# Patient Record
Sex: FEMALE | Race: WHITE | NOT HISPANIC OR LATINO | Employment: OTHER | URBAN - METROPOLITAN AREA
[De-identification: names, ages, dates, MRNs, and addresses within clinical notes are randomized per-mention and may not be internally consistent; named-entity substitution may affect disease eponyms.]

---

## 2017-03-09 ENCOUNTER — OFFICE VISIT - RIVER FALLS (OUTPATIENT)
Dept: FAMILY MEDICINE | Facility: CLINIC | Age: 72
End: 2017-03-09

## 2017-03-09 ASSESSMENT — MIFFLIN-ST. JEOR: SCORE: 1314.6

## 2017-05-08 ENCOUNTER — COMMUNICATION - RIVER FALLS (OUTPATIENT)
Dept: FAMILY MEDICINE | Facility: CLINIC | Age: 72
End: 2017-05-08

## 2017-05-08 ENCOUNTER — OFFICE VISIT - RIVER FALLS (OUTPATIENT)
Dept: FAMILY MEDICINE | Facility: CLINIC | Age: 72
End: 2017-05-08

## 2017-05-08 ASSESSMENT — MIFFLIN-ST. JEOR: SCORE: 1315.96

## 2017-05-09 LAB
CREAT SERPL-MCNC: 0.83 MG/DL (ref 0.6–0.93)
GLUCOSE BLD-MCNC: 124 MG/DL (ref 65–99)

## 2017-06-08 ENCOUNTER — OFFICE VISIT - RIVER FALLS (OUTPATIENT)
Dept: FAMILY MEDICINE | Facility: CLINIC | Age: 72
End: 2017-06-08

## 2017-11-06 ENCOUNTER — OFFICE VISIT - RIVER FALLS (OUTPATIENT)
Dept: FAMILY MEDICINE | Facility: CLINIC | Age: 72
End: 2017-11-06

## 2017-11-06 ASSESSMENT — MIFFLIN-ST. JEOR: SCORE: 1298.27

## 2017-11-07 LAB
CHOLEST SERPL-MCNC: 186 MG/DL
CHOLEST/HDLC SERPL: 4.1 {RATIO}
CREAT SERPL-MCNC: 0.88 MG/DL (ref 0.6–0.93)
GLUCOSE BLD-MCNC: 97 MG/DL (ref 65–99)
HDLC SERPL-MCNC: 45 MG/DL
LDLC SERPL CALC-MCNC: 108 MG/DL
NONHDLC SERPL-MCNC: 141 MG/DL
TRIGL SERPL-MCNC: 212 MG/DL

## 2018-01-04 ENCOUNTER — OFFICE VISIT - RIVER FALLS (OUTPATIENT)
Dept: FAMILY MEDICINE | Facility: CLINIC | Age: 73
End: 2018-01-04

## 2018-07-24 ENCOUNTER — AMBULATORY - RIVER FALLS (OUTPATIENT)
Dept: FAMILY MEDICINE | Facility: CLINIC | Age: 73
End: 2018-07-24

## 2018-07-25 LAB
CHOLEST SERPL-MCNC: 178 MG/DL
CHOLEST/HDLC SERPL: 4 {RATIO}
CREAT SERPL-MCNC: 0.82 MG/DL (ref 0.6–0.93)
GLUCOSE BLD-MCNC: 90 MG/DL (ref 65–99)
HDLC SERPL-MCNC: 45 MG/DL
LDLC SERPL CALC-MCNC: 99 MG/DL
NONHDLC SERPL-MCNC: 133 MG/DL
TRIGL SERPL-MCNC: 225 MG/DL

## 2018-08-20 ENCOUNTER — OFFICE VISIT - RIVER FALLS (OUTPATIENT)
Dept: FAMILY MEDICINE | Facility: CLINIC | Age: 73
End: 2018-08-20

## 2018-08-20 ASSESSMENT — MIFFLIN-ST. JEOR: SCORE: 1311.88

## 2018-11-12 ENCOUNTER — OFFICE VISIT - RIVER FALLS (OUTPATIENT)
Dept: FAMILY MEDICINE | Facility: CLINIC | Age: 73
End: 2018-11-12

## 2018-11-12 ASSESSMENT — MIFFLIN-ST. JEOR: SCORE: 1277.41

## 2018-11-13 LAB
CHOLEST SERPL-MCNC: 170 MG/DL
CHOLEST/HDLC SERPL: 3.3 {RATIO}
HDLC SERPL-MCNC: 51 MG/DL
LDLC SERPL CALC-MCNC: 91 MG/DL
NONHDLC SERPL-MCNC: 119 MG/DL
TRIGL SERPL-MCNC: 184 MG/DL

## 2019-07-09 ENCOUNTER — OFFICE VISIT - RIVER FALLS (OUTPATIENT)
Dept: FAMILY MEDICINE | Facility: CLINIC | Age: 74
End: 2019-07-09

## 2019-07-19 ENCOUNTER — OFFICE VISIT - RIVER FALLS (OUTPATIENT)
Dept: FAMILY MEDICINE | Facility: CLINIC | Age: 74
End: 2019-07-19

## 2019-11-19 ENCOUNTER — OFFICE VISIT - RIVER FALLS (OUTPATIENT)
Dept: FAMILY MEDICINE | Facility: CLINIC | Age: 74
End: 2019-11-19

## 2019-11-19 ENCOUNTER — TRANSFERRED RECORDS (OUTPATIENT)
Dept: MULTI SPECIALTY CLINIC | Facility: CLINIC | Age: 74
End: 2019-11-19

## 2019-11-19 ASSESSMENT — MIFFLIN-ST. JEOR: SCORE: 1268.33

## 2019-11-20 ENCOUNTER — COMMUNICATION - RIVER FALLS (OUTPATIENT)
Dept: FAMILY MEDICINE | Facility: CLINIC | Age: 74
End: 2019-11-20

## 2019-11-20 LAB
BUN SERPL-MCNC: 15 MG/DL (ref 7–25)
BUN/CREAT RATIO - HISTORICAL: ABNORMAL (ref 6–22)
CALCIUM SERPL-MCNC: 10.9 MG/DL (ref 8.6–10.4)
CHLORIDE BLD-SCNC: 103 MMOL/L (ref 98–110)
CHOLEST SERPL-MCNC: 184 MG/DL
CHOLEST/HDLC SERPL: 3.5 {RATIO}
CO2 SERPL-SCNC: 26 MMOL/L (ref 20–32)
CREAT SERPL-MCNC: 0.78 MG/DL (ref 0.6–0.93)
EGFRCR SERPLBLD CKD-EPI 2021: 74 ML/MIN/1.73M2
ERYTHROCYTE [DISTWIDTH] IN BLOOD BY AUTOMATED COUNT: 11.7 % (ref 11–15)
GLUCOSE BLD-MCNC: 101 MG/DL (ref 65–99)
HCT VFR BLD AUTO: 37.5 % (ref 35–45)
HDLC SERPL-MCNC: 52 MG/DL
HGB BLD-MCNC: 13.4 GM/DL (ref 11.7–15.5)
LDLC SERPL CALC-MCNC: 104 MG/DL
MCH RBC QN AUTO: 33.6 PG (ref 27–33)
MCHC RBC AUTO-ENTMCNC: 35.7 GM/DL (ref 32–36)
MCV RBC AUTO: 94 FL (ref 80–100)
NONHDLC SERPL-MCNC: 132 MG/DL
PLATELET # BLD AUTO: 315 10*3/UL (ref 140–400)
PMV BLD: 10.6 FL (ref 7.5–12.5)
POTASSIUM BLD-SCNC: 4.5 MMOL/L (ref 3.5–5.3)
RBC # BLD AUTO: 3.99 10*6/UL (ref 3.8–5.1)
SODIUM SERPL-SCNC: 139 MMOL/L (ref 135–146)
TRIGL SERPL-MCNC: 161 MG/DL
WBC # BLD AUTO: 6.6 10*3/UL (ref 3.8–10.8)

## 2020-12-10 ENCOUNTER — OFFICE VISIT - RIVER FALLS (OUTPATIENT)
Dept: FAMILY MEDICINE | Facility: CLINIC | Age: 75
End: 2020-12-10

## 2021-02-18 ENCOUNTER — OFFICE VISIT - RIVER FALLS (OUTPATIENT)
Dept: FAMILY MEDICINE | Facility: CLINIC | Age: 76
End: 2021-02-18

## 2021-02-22 ENCOUNTER — OFFICE VISIT - RIVER FALLS (OUTPATIENT)
Dept: FAMILY MEDICINE | Facility: CLINIC | Age: 76
End: 2021-02-22

## 2021-02-22 LAB
B-TYPE NATRIURETIC PEPTIDE: 24 PG/ML (ref 0–100)
BASOPHILS # BLD MANUAL: 0 CELLS/UL
BASOPHILS NFR BLD AUTO: 0.1 %
BUN SERPL-MCNC: 15 MG/DL
BUN/CREAT RATIO - HISTORICAL: 19
CALCIUM SERPL-MCNC: 9.8 MEQ/DL
CHLORIDE BLD-SCNC: 102 MEQ/L
CO2 SERPL-SCNC: 22 MEQ/L
CREAT SERPL-MCNC: 0.79 MG/DL
EOSINOPHIL # BLD MANUAL: 0.1 CELLS/UL
EOSINOPHIL NFR BLD AUTO: 0.7 %
ERYTHROCYTE [DISTWIDTH] IN BLOOD BY AUTOMATED COUNT: 12.9 %
GLUCOSE BLD-MCNC: 107 MG/DL
HCT VFR BLD AUTO: 39.8 %
HGB BLD-MCNC: 13.4 G/DL
LYMPHOCYTES # BLD MANUAL: 1.8 CELLS/UL
LYMPHOCYTES NFR BLD AUTO: 20.8 %
MCH RBC QN AUTO: 32.4 PG
MCHC RBC AUTO-ENTMCNC: 33.7 GM/DL
MCV RBC AUTO: 96 FL
MONOCYTES # BLD MANUAL: 1 CELLS/UL
MONOCYTES NFR BLD AUTO: 11.6 %
NEUTROPHILS # BLD MANUAL: 5.9 CELLS/UL
NEUTROPHILS NFR BLD AUTO: 66.8 %
PLATELET # BLD AUTO: 260 X10
PMV BLD: 10.2 FL
POTASSIUM BLD-SCNC: 3.4 MEQ/L
RBC # BLD AUTO: 4.14 X10
SODIUM SERPL-SCNC: 135 MEQ/L
WBC # BLD AUTO: 8.8 X10

## 2021-02-22 ASSESSMENT — MIFFLIN-ST. JEOR: SCORE: 1277.41

## 2021-03-17 ENCOUNTER — COMMUNICATION - RIVER FALLS (OUTPATIENT)
Dept: FAMILY MEDICINE | Facility: CLINIC | Age: 76
End: 2021-03-17

## 2021-03-25 ENCOUNTER — COMMUNICATION - RIVER FALLS (OUTPATIENT)
Dept: FAMILY MEDICINE | Facility: CLINIC | Age: 76
End: 2021-03-25

## 2021-04-01 ENCOUNTER — COMMUNICATION - RIVER FALLS (OUTPATIENT)
Dept: FAMILY MEDICINE | Facility: CLINIC | Age: 76
End: 2021-04-01

## 2021-04-12 ENCOUNTER — OFFICE VISIT - RIVER FALLS (OUTPATIENT)
Dept: FAMILY MEDICINE | Facility: CLINIC | Age: 76
End: 2021-04-12

## 2021-04-12 ASSESSMENT — MIFFLIN-ST. JEOR: SCORE: 1218.44

## 2021-08-09 ENCOUNTER — COMMUNICATION - RIVER FALLS (OUTPATIENT)
Dept: FAMILY MEDICINE | Facility: CLINIC | Age: 76
End: 2021-08-09

## 2021-11-23 ENCOUNTER — OFFICE VISIT - RIVER FALLS (OUTPATIENT)
Dept: FAMILY MEDICINE | Facility: CLINIC | Age: 76
End: 2021-11-23

## 2021-11-24 ENCOUNTER — OFFICE VISIT - RIVER FALLS (OUTPATIENT)
Dept: FAMILY MEDICINE | Facility: CLINIC | Age: 76
End: 2021-11-24

## 2021-12-01 ENCOUNTER — COMMUNICATION - RIVER FALLS (OUTPATIENT)
Dept: FAMILY MEDICINE | Facility: CLINIC | Age: 76
End: 2021-12-01

## 2022-01-05 ENCOUNTER — OFFICE VISIT - RIVER FALLS (OUTPATIENT)
Dept: FAMILY MEDICINE | Facility: CLINIC | Age: 77
End: 2022-01-05

## 2022-02-12 VITALS
DIASTOLIC BLOOD PRESSURE: 92 MMHG | TEMPERATURE: 99.1 F | TEMPERATURE: 99.8 F | HEART RATE: 89 BPM | SYSTOLIC BLOOD PRESSURE: 175 MMHG | WEIGHT: 188 LBS | RESPIRATION RATE: 16 BRPM | OXYGEN SATURATION: 96 % | BODY MASS INDEX: 36.72 KG/M2 | WEIGHT: 189 LBS | SYSTOLIC BLOOD PRESSURE: 168 MMHG | HEART RATE: 82 BPM | BODY MASS INDEX: 36.91 KG/M2 | DIASTOLIC BLOOD PRESSURE: 90 MMHG

## 2022-02-12 VITALS
HEART RATE: 85 BPM | HEART RATE: 72 BPM | WEIGHT: 190 LBS | HEIGHT: 60 IN | DIASTOLIC BLOOD PRESSURE: 80 MMHG | TEMPERATURE: 98.2 F | HEIGHT: 60 IN | TEMPERATURE: 97.1 F | SYSTOLIC BLOOD PRESSURE: 146 MMHG | BODY MASS INDEX: 37.69 KG/M2 | OXYGEN SATURATION: 93 % | DIASTOLIC BLOOD PRESSURE: 84 MMHG | OXYGEN SATURATION: 96 % | BODY MASS INDEX: 37.3 KG/M2 | WEIGHT: 192 LBS | SYSTOLIC BLOOD PRESSURE: 140 MMHG

## 2022-02-12 VITALS
HEART RATE: 89 BPM | SYSTOLIC BLOOD PRESSURE: 136 MMHG | DIASTOLIC BLOOD PRESSURE: 89 MMHG | TEMPERATURE: 96.8 F | BODY MASS INDEX: 37.69 KG/M2 | OXYGEN SATURATION: 97 % | HEIGHT: 60 IN | SYSTOLIC BLOOD PRESSURE: 146 MMHG | DIASTOLIC BLOOD PRESSURE: 86 MMHG | HEIGHT: 60 IN | WEIGHT: 179 LBS | HEART RATE: 90 BPM | BODY MASS INDEX: 35.14 KG/M2 | WEIGHT: 192 LBS

## 2022-02-12 VITALS
SYSTOLIC BLOOD PRESSURE: 134 MMHG | HEIGHT: 60 IN | DIASTOLIC BLOOD PRESSURE: 80 MMHG | SYSTOLIC BLOOD PRESSURE: 160 MMHG | HEART RATE: 68 BPM | WEIGHT: 199.6 LBS | TEMPERATURE: 98.7 F | BODY MASS INDEX: 39.19 KG/M2 | HEART RATE: 80 BPM | DIASTOLIC BLOOD PRESSURE: 90 MMHG

## 2022-02-12 VITALS
TEMPERATURE: 99.1 F | OXYGEN SATURATION: 95 % | SYSTOLIC BLOOD PRESSURE: 138 MMHG | DIASTOLIC BLOOD PRESSURE: 82 MMHG | HEART RATE: 73 BPM

## 2022-02-12 VITALS
WEIGHT: 197.6 LBS | DIASTOLIC BLOOD PRESSURE: 82 MMHG | HEART RATE: 68 BPM | TEMPERATURE: 99 F | BODY MASS INDEX: 38.59 KG/M2 | SYSTOLIC BLOOD PRESSURE: 140 MMHG | OXYGEN SATURATION: 96 %

## 2022-02-12 VITALS
BODY MASS INDEX: 39.37 KG/M2 | WEIGHT: 200.5 LBS | WEIGHT: 200.2 LBS | SYSTOLIC BLOOD PRESSURE: 142 MMHG | OXYGEN SATURATION: 96 % | HEIGHT: 60 IN | HEIGHT: 60 IN | HEART RATE: 84 BPM | SYSTOLIC BLOOD PRESSURE: 148 MMHG | BODY MASS INDEX: 39.3 KG/M2 | HEART RATE: 80 BPM | DIASTOLIC BLOOD PRESSURE: 84 MMHG | OXYGEN SATURATION: 95 % | DIASTOLIC BLOOD PRESSURE: 78 MMHG | TEMPERATURE: 97.8 F

## 2022-02-12 VITALS
HEART RATE: 68 BPM | TEMPERATURE: 97.7 F | WEIGHT: 196.6 LBS | BODY MASS INDEX: 38.6 KG/M2 | SYSTOLIC BLOOD PRESSURE: 132 MMHG | DIASTOLIC BLOOD PRESSURE: 74 MMHG | HEIGHT: 60 IN

## 2022-02-15 NOTE — NURSING NOTE
Phone Message    PCP:   FREDDIE       Time of Call:  1:05 pm       Person Calling:  pt  Phone number:  [phone]    Returned call at: 1:56 pm    Note:   Pt calls requesting to get disc of her chest xray from March. Will be going to the specialist next week. This was done, left at . Pt notified.
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
## 2022-02-15 NOTE — LETTER
(Inserted Image. Unable to display)   May 13, 2021  TARA BURCH  [address]  [place] [address]        Dear TARA,    Thank you for selecting [facility] for your healthcare needs.    Our records indicate you are due for the following services:     Follow-up office visit      (FYI   Regarding office visits: In some instances, a video visit or telephone visit may be offered as an option.)    To schedule an appointment or if you have further questions, please contact your clinic at [phone].    Powered by [employer]    Sincerely,    Joe Rosas MD

## 2022-02-15 NOTE — TELEPHONE ENCOUNTER
"---------------------  From: Lakesha Golden CMA (Phone Messages Pool ([id number]))   To: [employer] ([id number]);     Sent: 2/15/2021 4:06:48 PM CST  Subject: Fluoxetine concern     Phone message    PCP: FREDDIE OC until Wed, requesting KAH    Person calling: Marilyn  Phone number: [phone]  Time message left: 1506  Return call time: _    Reason: Marilyn called and left a message wondering if she can stop fluoxetine, she stated that it has been making her feel \"loopy\" and have   bad dreams, she is wondering if she can just stop the medication or if she needs to taper? Please advise and pt requesting a call back.     LOV: 12/10/2020 phone visit with FREDDIE      Transferred to: MICHELL Golden CMA---------------------  From: Maddie Ritchie CMA ([employer] Message Pool ([id number]))   To: Colby Colunga PA-C;     Sent: 2/15/2021 4:45:02 PM CST  Subject: FW: Fluoxetine concern---------------------  From: Colby Colunga PA-C   To: [employer] ([id number]);     Sent: 2/15/2021 5:01:15 PM CST  Subject: RE: Fluoxetine concern     I did call her. Will take fluoxetine 20 mg caps QOD x two weeks, then stop.  No concerns about mood at this time. FU if symptoms persists, or if mood worsens.    KAHnoted  "

## 2022-02-15 NOTE — TELEPHONE ENCOUNTER
Entered by Luna Mendoza MA on December 02, 2019 1:09:09 PM CST  ---------------------  From: Luna Mendoza MA   To: [employer]     Sent: 12/2/2019 1:09:09 PM CST  Subject: Medication Management     ** Not Approved: Refill not appropriate, Filled 11/19/19 #90 with 3 refills **  buPROPion (BUPROPION HCL 75 MG TABLET)  TAKE ONE Tablet BY MOUTH EVERY DAY  Qty:  90 tab(s)        Days Supply:  90        Refills:  0          Substitutions Allowed     Route To Pharmacy - [facility]    Signed by Luna Mendoza MA            ------------------------------------------  From: [employer]   To: Colby Colunga PA-C  Sent: December 2, 2019 12:06:20 PM CST  Subject: Medication Management  Due: December 3, 2019 12:06:20 PM CST    ** On Hold Pending Signature **  Drug: buPROPion (buPROPion 75 mg oral tablet)  1 tab(s) Oral daily  Quantity: 90 tab(s)  Days Supply: 0  Refills: 0  Substitutions Allowed  Notes from Pharmacy:     Dispensed Drug: buPROPion (buPROPion 75 mg oral tablet)  TAKE ONE Tablet BY MOUTH EVERY DAY  Quantity: 90 tab(s)  Days Supply: 90  Refills: 0  Substitutions Allowed  Notes from Pharmacy:   ------------------------------------------

## 2022-02-15 NOTE — TELEPHONE ENCOUNTER
---------------------  From: Bree Rico LPN (Phone Messages Pool ([id number]_[place] [facility] [place]))   To: FREDDIE Message Pool ([id number]_[facility]);     Sent: 3/17/2021 11:00:03 AM CDT  Subject: CONSUMER MESSAGE FW: Medical follow-up questions for Marilyn Burch           ---------------------  From: MARILYN BURCH  To: [facility]  Sent: 03/17/2021 10:40 a.m. CDT  Subject: Medical follow-up questions for Marilyn Burch  This is Wong Burch and I am the medical POA for my mother.  I am following up on phone discussions I had the last 2 days regarding my mother s care.    First was a question about her Fluoxetine (Prozac) 20mg daily prescription.  It does not show up on the records from her last visit we had with doctor Dale.  It looks like this prescription fell off the medications list but was filled in February so she has continued taking it daily.  I checked with [facility] in [place] and it is not on their records.  Can someone confirm what the correct answer is?  I personally took this at one time in the past and I know that patients should not go off of this cold turkey and in my mothers case it definitely would not be a good idea.    Second, she has been really down, tired, and lethargic the last few days.  At first she was unable to sleep at night but starting late Monday all she wants to do is to sleep all the time.  Her medications were updated around 2/22/2021 so I wanted to see if there is any reason she should not be fully adjusted to the dosages.  She seems extremely anxious and unfocused.    Third, related to the second item, my sister (financial POA) and I were wondering if she needs to have labs done to see if there is anything that should be checked.  Her last physical check was a virtual visit around Thanksgiving so we know none of these tests were done.  She has also has not been eating much lately so there is concern that she might be anemic as  well.    thanks,    Wong Burch  [phone] Wong,     Can you please schedule an appointment to address these concerns? Ideally in person would be best but we could schedule a video or phone visit if that would be easier for you.     Thanks so much!    Ashlee---------------------  From: Ashlee Patel CMA ([employer] Message Pool ([id number]_[facility]))   To: TARA BURCH    Sent: 3/17/2021 1:13:10 PM CDT  Subject: RE: CONSUMER MESSAGE FW: Medical follow-up questions for Tara Burch

## 2022-02-15 NOTE — LETTER
(Inserted Image. Unable to display)   December 14, 2021  TARA BURCH  [address]  [place] [address]        Dear TARA,    Thank you for selecting [facility] for your healthcare needs.    Our records indicate you are due for the following services:     Annual Wellness Visit  Fasting Lab Tests ~ Please do not eat or drink anything 10 hours prior to your scheduled appointment time.  (Water and any medications that you may need are allowed unless directed otherwise.)     If you had your labs done at another facility or with [facility], please bring in a copy of the results to your next visit, mail a copy, or drop off a copy of your results to your Healthcare Provider.     (FYI   Regarding office visits: In some instances, a video visit or telephone visit may be offered as an option.)    To schedule an appointment or if you have further questions, please contact your clinic at [phone].    Powered by [employer]    Sincerely,    Joe Rosas MD

## 2022-02-15 NOTE — TELEPHONE ENCOUNTER
Entered by Luna Mendoza MA on August 25, 2020 11:13:31 AM CDT  ---------------------  From: Luna Mendoza MA   To: [facility] [employer] [facility]    Sent: 8/25/2020 11:13:31 AM CDT  Subject: Medication Management     ** Not Approved: Refill not appropriate, Not due for refill until novemeber 2020 **  amLODIPine (amlodipine 5 mg tablet)  TAKE ONE Tablet BY MOUTH EVERY DAY  Qty:  90 tab(s)        Days Supply:  90        Refills:  3          Substitutions Allowed     Route To Pharmacy - [facility]   Note from Pharmacy:  This prescription was filled on 8/25/2020. Any refills authorized will be placed on file.  Signed by Luna Mendoza MA            ------------------------------------------  From: [facility]  To: Colby Colunga PA-C  Sent: August 25, 2020 9:06:56 AM CDT  Subject: Medication Management  Due: August 12, 2020 4:14:54 PM CDT     ** On Hold Pending Signature **     Drug: amLODIPine (amLODIPine 5 mg oral tablet), 1 tab(s) Oral daily  Quantity: 90 tab(s)  Days Supply: 0  Refills: 2  Substitutions Allowed  Notes from Pharmacy:     Dispensed Drug: amLODIPine (amLODIPine 5 mg oral tablet), TAKE ONE Tablet BY MOUTH EVERY DAY  Quantity: 90 tab(s)  Days Supply: 90  Refills: 3  Substitutions Allowed  Notes from Pharmacy: This prescription was filled on 8/25/2020. Any refills authorized will be placed on file.  ------------------------------------------

## 2022-02-15 NOTE — TELEPHONE ENCOUNTER
---------------------  From: Melia Ballesteros   To: TARA BURCH    Sent: 7/7/2021 12:05:42 PM CDT  Subject: General Message     Good Afternoon Tara,    You are due for a fasting lab and a follow up appointment with Dr. Rosas. If you'd like to get those appointments scheduled please message us back in your portal or give our clinic a call at [phone].    Melia Dominguez in Patient Services

## 2022-02-15 NOTE — TELEPHONE ENCOUNTER
Entered by Luna Mendoza MA on August 19, 2020 9:14:07 AM CDT  ---------------------  From: Luna Mendoza MA   To: [facility]    Sent: 8/19/2020 9:14:07 AM CDT  Subject: Medication Management     ** Not Approved: Refill not appropriate, Filled 11/19/19 #90 with 3 refills **  montelukast (montelukast 10 mg tablet)  TAKE ONE TABLET BY MOUTH EVERY EVENING  Qty:  90 tab(s)        Days Supply:  60        Refills:  3          Substitutions Allowed     Route To Pharmacy - [facility]   Signed by Luna Mendoza MA            ------------------------------------------  From: [facility]  To: Colby Colunga PA-C  Sent: August 19, 2020 9:09:01 AM CDT  Subject: Medication Management  Due: August 12, 2020 4:17:26 PM CDT     ** On Hold Pending Signature **     Drug: montelukast (montelukast 10 mg oral tablet), 1 tab(s) Oral qpm  Quantity: 90 tab(s)  Days Supply: 0  Refills: 2  Substitutions Allowed  Notes from Pharmacy:     Dispensed Drug: montelukast (montelukast 10 mg oral tablet), TAKE ONE TABLET BY MOUTH EVERY EVENING  Quantity: 90 tab(s)  Days Supply: 60  Refills: 3  Substitutions Allowed  Notes from Pharmacy:  ------------------------------------------
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
## 2022-02-15 NOTE — LETTER
(Inserted Image. Unable to display)   January 13, 2020      TARA BURCH  [address]   [place], [place] [id number]        Dear TARA,      Thank you for selecting [facility] (previously [facility]) for your healthcare needs.     Our records indicate you are due for the following services:    Annual Physical  Fasting Lab Tests ~ Please do not eat or drink anything 10 hours prior to your scheduled appointment time.                (Water and any medications that you may need are allowed unless directed otherwise.)    If you had your labs done at another facility or with [facility] at [facility], please bring in a copy of the results to your next visit, mail a copy, or drop off a copy of your results to your Healthcare Provider.    You are due for lab work and an office visit; please schedule the lab appointment 1 week before the office visit.  This will assure all results are available to discuss with your provider during your visit.    **It is very helpful if you bring your medication bottles to your appointment.  This assures we have all of your current medications, including strength and dosing information, documented accurately in your medical record.    To schedule an appointment or if you have further questions, please contact your primary clinic:   [facility]  [phone]   [facility]  [phone]             [facility]      [phone]      Powered by [employer]    Sincerely,    Joe Rosas M.D.

## 2022-02-15 NOTE — NURSING NOTE
Comprehensive Intake Entered On:  7/9/2019 6:50 PM CDT    Performed On:  7/9/2019 6:42 PM CDT by Rebecca Quiñones CMA               Summary   Chief Complaint :   Patient presents with a cough and tightness in the chest, nasal congestion/drainage x 4 days.   Advance Directive :   Yes   Weight Measured :   188 lb(Converted to: 188 lb 0 oz, 85.28 kg)    Height/Length Estimated :   60 in(Converted to: 5 ft 0 in, 152.40 cm)    Systolic Blood Pressure :   168 mmHg (HI)    Diastolic Blood Pressure :   90 mmHg (HI)    Mean Arterial Pressure :   116 mmHg   Peripheral Pulse Rate :   82 bpm   BP Site :   Left arm   BP Method :   Manual   Temperature Tympanic :   99.8 DegF(Converted to: 37.7 DegC)    Respiratory Rate :   16 br/min   Oxygen Saturation :   96 %   Rebecca Quiñones CMA - 7/9/2019 6:42 PM CDT   Health Status   Allergies Verified? :   Yes   Medication History Verified? :   Yes   Immunizations Current :   Yes   Medical History Verified? :   Yes   Pre-Visit Planning Status :   Completed   Tobacco Use? :   Never smoker   Rebecca Quiñones CMA - 7/9/2019 6:42 PM CDT   Consents   Consent for Immunization Exchange :   Consent Granted   Consent for Immunizations to Providers :   Consent Granted   Rebecca Quiñones CMA - 7/9/2019 6:42 PM CDT   Meds / Allergies   (As Of: 7/9/2019 6:50:25 PM CDT)   Allergies (Active)   Actonel  Estimated Onset Date:   Unspecified ; Reactions:   Leg pain ; Created By:   Esperanza Mercado; Reaction Status:   Active ; Category:   Drug ; Substance:   Actonel ; Type:   Intolerance ; Updated By:   Esperanza Mercado; Reviewed Date:   7/9/2019 6:46 PM CDT      Evista  Estimated Onset Date:   Unspecified ; Reactions:   Leg cramps ; Created By:   Esperanza Mercado; Reaction Status:   Active ; Category:   Drug ; Substance:   Evista ; Type:   Intolerance ; Updated By:   Esperanza Mercado; Reviewed Date:   7/9/2019 6:46 PM CDT      simvastatin  Estimated Onset Date:   Unspecified ; Reactions:   Leg Pain ; Created By:   Telly  Humaira; Reaction Status:   Active ; Category:   Drug ; Substance:   simvastatin ; Type:   Side Effect ; Updated By:   Humaira Varner; Reviewed Date:   7/9/2019 6:46 PM CDT        Medication List   (As Of: 7/9/2019 6:50:25 PM CDT)   Prescription/Discharge Order    acetaminophen  :   acetaminophen ; Status:   Prescribed ; Ordered As Mnemonic:   acetaminophen 500 mg oral tablet ; Simple Display Line:   1-2tabs, po, q8 hrs, 60 tab(s), PRN: as needed for pain ; Ordering Provider:   Joe Rosas MD; Catalog Code:   acetaminophen ; Order Dt/Tm:   6/24/2014 12:43:45 PM          atorvastatin  :   atorvastatin ; Status:   Prescribed ; Ordered As Mnemonic:   atorvastatin 40 mg oral tablet ; Simple Display Line:   40 mg, 1 tab(s), po, daily, 90 tab(s), 3 Refill(s) ; Ordering Provider:   Joe Rosas MD; Catalog Code:   atorvastatin ; Order Dt/Tm:   11/12/2018 12:03:02 PM          buPROPion  :   buPROPion ; Status:   Prescribed ; Ordered As Mnemonic:   buPROPion 75 mg oral tablet ; Simple Display Line:   75 mg, 1 tab(s), po, daily, 90 tab(s), 3 Refill(s) ; Ordering Provider:   Joe Rosas MD; Catalog Code:   buPROPion ; Order Dt/Tm:   11/12/2018 12:02:58 PM          donepezil  :   donepezil ; Status:   Prescribed ; Ordered As Mnemonic:   donepezil 5 mg oral tablet ; Simple Display Line:   5 mg, 1 tab(s), po, hs, 90 tab(s), 3 Refill(s) ; Ordering Provider:   Joe Rosas MD; Catalog Code:   donepezil ; Order Dt/Tm:   11/12/2018 12:03:00 PM          FLUoxetine  :   FLUoxetine ; Status:   Prescribed ; Ordered As Mnemonic:   FLUoxetine 20 mg oral capsule ; Simple Display Line:   20 mg, 1 cap(s), po, daily, 90 cap(s), 3 Refill(s) ; Ordering Provider:   Joe Rosas MD; Catalog Code:   FLUoxetine ; Order Dt/Tm:   11/12/2018 12:02:52 PM          lisinopril  :   lisinopril ; Status:   Prescribed ; Ordered As Mnemonic:   lisinopril 20 mg oral tablet ; Simple Display Line:   20 mg, 1 tab(s), po, daily, 90 tab(s),  3 Refill(s) ; Ordering Provider:   Joe Rosas MD; Catalog Code:   lisinopril ; Order Dt/Tm:   11/12/2018 12:02:56 PM          Miscellaneous Rx Supply  :   Miscellaneous Rx Supply ; Status:   Prescribed ; Ordered As Mnemonic:   knee high moderate compression stockings ; Simple Display Line:   See Instructions, wear daily from morning until bedtime, 2 EA, 0 Refill(s) ; Ordering Provider:   Joe Rosas MD; Catalog Code:   Miscellaneous Rx Supply ; Order Dt/Tm:   5/11/2017 3:15:03 PM          montelukast  :   montelukast ; Status:   Prescribed ; Ordered As Mnemonic:   montelukast 10 mg oral tablet ; Simple Display Line:   10 mg, 1 tab(s), po, qpm, 90 tab(s), 3 Refill(s) ; Ordering Provider:   Joe Rosas MD; Catalog Code:   montelukast ; Order Dt/Tm:   11/12/2018 12:02:55 PM          multivitamin  :   multivitamin ; Status:   Prescribed ; Ordered As Mnemonic:   Multiple Vitamins oral capsule ; Simple Display Line:   1 cap(s), po, Daily, 90 cap(s) ; Ordering Provider:   Joe Rosas MD; Catalog Code:   multivitamin ; Order Dt/Tm:   6/24/2014 12:43:58 PM

## 2022-02-15 NOTE — NURSING NOTE
Generalized Anxiety Disorder Screening Entered On:  4/12/2021 12:16 PM CDT    Performed On:  4/12/2021 12:16 PM CDT by Ashlee Patel CMA               Generalized Anxiety Disorder Screening   NIKKIE Nervous, Anxious On Edge :   More than half the days   NIKKIE Control Worrying B :   More than half the days   NIKKIE Worrying Too Much :   More than half the days   NIKKIE Trouble Relaxing :   Not at all   NIKKIE Restless :   Nearly every day   NIKKIE Easily Annoyed/Irritable :   Not at all   NIKKIE Afraid :   Several days   NIKKIE Total Screening Score :   10    NIKKIE Difficulty with Work, Home, Others :   Very difficult   Ashlee Patel CMA - 4/12/2021 12:16 PM CDT

## 2022-02-15 NOTE — TELEPHONE ENCOUNTER
---------------------  From: Rosaura Sebastian CMA (Phone Messages Pool ([id number]_[place]))   To: [facility] Message Pool ([id number]_[facility]);     Sent: 8/9/2021 2:11:40 PM CDT  Subject: General Message-Sertraline/diarrhea     Pt aware KWL already oc today.  Phone Message:      PCP: FREDDIE    Person Calling: Aston  Phone: [phone]  Time: 2:00pm    Reason for call: Pt called stating that the Sertraline medication is still causing stomach cramps and diarrhea. Had dosage decreased down to 50mg daily back on 5/12 since the 100mg was also causing diarrhea. Pt wondering if she can just stop the medication. Advised pt to continue medication as directed until we hear back from Dr. Rosas. Pt agrees. Was last seen on 4/12/21 for Anxiety.---------------------  From: Ashlee Patel CMA ([facility] Message Pool ([id number]_[facility]))   To: Joe Rosas MD;     Sent: 8/10/2021 9:17:55 AM CDT  Subject: FW: General Message-Sertraline/diarrheaupdated phone # [phone] for son Wong that mom was requesting medication changes. He has been bringing her to recent visit.    Spoke with patient. Feels like sertraline is causing diarrhea and diarrhea has led to a 40# weight loss.  I am concerned that if she has lost that much weight, diarrhea may not be from sertraline.  Will stop sertraline and monitor. If diarrhea isn't resolved in a week, she needs to be seen in clinic.    Please call pharmacy on Thursday and make sure they don't fill sertraline with upcoming med fill. Thanks  ** Submitted: **  Complete:sertraline (sertraline 50 mg oral tablet)   Signed by Joe Rosas MD  8/11/2021 10:49:00 PM [facility]---------------------  From: Joe Rosas MD   To: [facility] Message Pool ([id number]_[place]);     Sent: 8/11/2021 5:49:32 PM CDT  Subject: RE: General Message-Sertraline/diarrheapharmacy notified.
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
## 2022-02-15 NOTE — NURSING NOTE
Comprehensive Intake Entered On:  12/10/2020 9:36 AM CST    Performed On:  12/10/2020 9:32 AM CST by Ashlee Patel CMA               Summary   Chief Complaint :   Chronic disease f/u and med refills. Verbal consent obtained for a video visit.   Ashlee Patel CMA - 12/10/2020 9:42 AM CST   Advance Directive :   Yes   Height/Length Estimated :   60 in(Converted to: 5 ft 0 in, 152.40 cm)    Ashlee Patel CMA - 12/10/2020 9:32 AM CST   Health Status   Allergies Verified? :   Yes   Medication History Verified? :   Yes   Immunizations Current :   Yes   Pre-Visit Planning Status :   Completed   Tobacco Use? :   Never smoker   Ashlee Patel CMA - 12/10/2020 9:32 AM CST   Consents   Consent for Immunization Exchange :   Consent Granted   Consent for Immunizations to Providers :   Consent Granted   Ashlee Patel CMA - 12/10/2020 9:32 AM CST   Meds / Allergies   (As Of: 12/10/2020 9:36:27 AM CST)   Allergies (Active)   Actonel  Estimated Onset Date:   Unspecified ; Reactions:   Leg pain ; Created By:   Esperanza Mercado; Reaction Status:   Active ; Category:   Drug ; Substance:   Actonel ; Type:   Intolerance ; Updated By:   Esperanza Mercado; Reviewed Date:   11/19/2019 8:06 AM CST      Evista  Estimated Onset Date:   Unspecified ; Reactions:   Leg cramps ; Created By:   Esperanza Mercado; Reaction Status:   Active ; Category:   Drug ; Substance:   Evista ; Type:   Intolerance ; Updated By:   Esperanza Mercado; Reviewed Date:   11/19/2019 8:06 AM CST      simvastatin  Estimated Onset Date:   Unspecified ; Reactions:   Leg Pain ; Created By:   Humaira Varner CMA; Reaction Status:   Active ; Category:   Drug ; Substance:   simvastatin ; Type:   Side Effect ; Updated By:   Humaira Varner CMA; Reviewed Date:   11/19/2019 8:06 AM CST        Medication List   (As Of: 12/10/2020 9:36:27 AM CST)   Prescription/Discharge Order    acetaminophen  :   acetaminophen ; Status:   Prescribed ; Ordered As Mnemonic:   acetaminophen 500 mg oral tablet ;  Simple Display Line:   1-2tabs, po, q8 hrs, 60 tab(s), PRN: as needed for pain ; Ordering Provider:   Joe Rosas MD; Catalog Code:   acetaminophen ; Order Dt/Tm:   6/24/2014 12:43:45 PM CDT          amLODIPine  :   amLODIPine ; Status:   Prescribed ; Ordered As Mnemonic:   amLODIPine 5 mg oral tablet ; Simple Display Line:   1 tab(s), Oral, daily, 30 tab(s), 0 Refill(s) ; Ordering Provider:   Colby Colunga PA-C; Catalog Code:   amLODIPine ; Order Dt/Tm:   11/3/2020 7:43:42 AM CST          atorvastatin  :   atorvastatin ; Status:   Prescribed ; Ordered As Mnemonic:   atorvastatin 40 mg oral tablet ; Simple Display Line:   1 tab(s), Oral, daily, 30 tab(s), 0 Refill(s) ; Ordering Provider:   Colby Colunga PA-C; Catalog Code:   atorvastatin ; Order Dt/Tm:   11/3/2020 7:44:04 AM CST          buPROPion  :   buPROPion ; Status:   Prescribed ; Ordered As Mnemonic:   buPROPion 75 mg oral tablet ; Simple Display Line:   1 tab(s), Oral, daily, 30 tab(s), 0 Refill(s) ; Ordering Provider:   Colby Colunga PA-C; Catalog Code:   buPROPion ; Order Dt/Tm:   11/3/2020 7:44:37 AM CST          donepezil  :   donepezil ; Status:   Prescribed ; Ordered As Mnemonic:   donepezil 5 mg oral tablet ; Simple Display Line:   1 tab(s), Oral, qhs, 90 tab(s), 3 Refill(s) ; Ordering Provider:   Colby Colunga PA-C; Catalog Code:   donepezil ; Order Dt/Tm:   6/1/2020 1:45:37 PM CDT          FLUoxetine  :   FLUoxetine ; Status:   Prescribed ; Ordered As Mnemonic:   FLUoxetine 20 mg oral capsule ; Simple Display Line:   1 cap(s), Oral, daily, 30 cap(s), 0 Refill(s) ; Ordering Provider:   Colby Colunga PA-C; Catalog Code:   FLUoxetine ; Order Dt/Tm:   11/3/2020 7:44:58 AM CST          lisinopril  :   lisinopril ; Status:   Prescribed ; Ordered As Mnemonic:   lisinopril 20 mg oral tablet ; Simple Display Line:   1 tab(s), Oral, daily, 14 tab(s), 0 Refill(s) ; Ordering Provider:   Colby Colunga PA-C; Catalog Code:   lisinopril ; Order Dt/Tm:    12/1/2020 2:41:26 PM CST          Miscellaneous Rx Supply  :   Miscellaneous Rx Supply ; Status:   Prescribed ; Ordered As Mnemonic:   knee high moderate compression stockings ; Simple Display Line:   See Instructions, wear daily from morning until bedtime, 2 EA, 0 Refill(s) ; Ordering Provider:   Joe Rosas MD; Catalog Code:   Miscellaneous Rx Supply ; Order Dt/Tm:   5/11/2017 3:15:03 PM CDT          montelukast  :   montelukast ; Status:   Prescribed ; Ordered As Mnemonic:   montelukast 10 mg oral tablet ; Simple Display Line:   1 tab(s), Oral, qpm, 30 tab(s), 0 Refill(s) ; Ordering Provider:   Colby Colunga PA-C; Catalog Code:   montelukast ; Order Dt/Tm:   11/3/2020 7:45:38 AM CST          multivitamin  :   multivitamin ; Status:   Prescribed ; Ordered As Mnemonic:   Multiple Vitamins oral capsule ; Simple Display Line:   1 cap(s), po, Daily, 90 cap(s) ; Ordering Provider:   Joe Rosas MD; Catalog Code:   multivitamin ; Order Dt/Tm:   6/24/2014 12:43:58 PM CDT            ID Risk Screen   Recent Travel History :   No recent travel   Family Member Travel History :   No recent travel   Other Exposure to Infectious Disease :   Unknown   Ashlee Patel CMA - 12/10/2020 9:32 AM CST   Depression Screening   Little Interest - Pleasure in Activities :   Not at all   Feeling Down, Depressed, Hopeless :   Several days   Initial Depression Screen Score :   1 Score   Poor Appetite or Overeating :   Not at all   Trouble Falling or Staying Asleep :   Not at all   Feeling Tired or Little Energy :   Several days   Feeling Bad About Yourself :   Not at all   Trouble Concentrating :   Not at all   Moving or Speaking Slowly :   Several days   Thoughts Better Off Dead or Hurting Self :   Not at all   NIKKIE Difficulty with Work, Home, Others :   Not difficult at all   Detailed Depression Screen Score :   2    Total Depression Screen Score :   3    Ashlee Patel CMA - 12/10/2020 9:32 AM CST   Social History   Social History    (As Of: 12/10/2020 9:36:27 AM CST)   Alcohol:  Denies Alcohol Use      Never   (Last Updated: 10/23/2012 1:06:04 PM CDT by Christina Mitchell LPN)          Tobacco:  Denies Tobacco Use      Never (less than 100 in lifetime)   (Last Updated: 12/10/2020 9:35:38 AM CST by Ashlee Patel CMA)          Electronic Cigarette/Vaping:  Denies Electronic Cigarette Use      Electronic Cigarette Use: Never.   (Last Updated: 12/10/2020 9:33:02 AM CST by Ashlee Patel CMA)          Substance Abuse:  Denies Substance Abuse      Never   (Last Updated: 10/5/2011 8:51:01 AM CDT by Irma Daigle)          Employment/School:        Retired, Work/School description: Works for [employer].   (Last Updated: 10/23/2012 1:06:26 PM CDT by Christina Mitchell LPN)          Home/Environment:        Marital status: .   (Last Updated: 10/5/2011 8:51:16 AM CDT by Irma Daigle)   Living situation: Home with assistance.  Home equipment: Walker/Cane.  Injuries/Abuse/Neglect in household: No.  Feels unsafe at home: No.  Family/Friends available for support: Yes.  Risks in environment: [place]   (Last Updated: 2019 1:16:06 PM CST by Irma Daigle)          Nutrition/Health:        Type of diet: Regular.   (Last Updated: 10/23/2012 1:06:38 PM CDT by Christina Mitchell LPN)          Exercise:  Does not exercise      Exercise frequency: ..   (Last Updated: 11/15/2018 1:30:14 PM CST by Irma Daigle)          Sexual:        Sexual orientation: Heterosexual.   (Last Updated: 10/23/2012 1:06:46 PM CDT by Christina Mitchell LPN)   Sexually active: No.  History of STD: No.  History of sexual abuse: No.   (Last Updated: 2019 1:16:25 PM CST by Irma Daigle)          Other:         Comments:  2010 11:10 AM - Taty Shore CMA:  Agustin   CA lung   (Last Updated: 2010 11:10:09 AM CDT by Antoni Shore CMA

## 2022-02-15 NOTE — TELEPHONE ENCOUNTER
Entered by Maddie Ritchie CMA on November 05, 2020 12:39:42 PM CST  ---------------------  From: Maddie Ritchie CMA   To: [facility]    Sent: 11/5/2020 12:39:42 PM CST  Subject: Medication Management     ** Not Approved: Patient needs appointment **  montelukast (montelukast 10 mg tablet)  TAKE ONE TABLET BY MOUTH EVERY EVENING  Qty:  30 tab(s)        Days Supply:  30        Refills:  0          Substitutions Allowed     Route To [facility]   Note from Pharmacy:  Patient needs more than 90 days due to being out of town  Signed by Maddie Ritchie CMA            ** Not Approved: Patient needs appointment **  lisinopril (lisinopril 20 mg tablet)  TAKE ONE TABLET BY MOUTH DAILY  Qty:  30 tab(s)        Days Supply:  30        Refills:  0          Substitutions Allowed     Route To [facility]   Note from Pharmacy:  Patient needs more than 90 days due to being out of town  Signed by Maddie Ritchie CMA            ** Not Approved: Patient needs appointment **  FLUoxetine (fluoxetine 20 mg capsule)  TAKE ONE CAPSULE BY MOUTH DAILY  Qty:  30 cap(s)        Days Supply:  30        Refills:  0          Substitutions Allowed     Route To [facility]   Note from Pharmacy:  Patient needs more than 90 days due to being out of town  Signed by Maddie Ritchie CMA            ** Not Approved: Patient needs appointment **  buPROPion (bupropion HCl 75 mg tablet)  TAKE ONE TABLET BY MOUTH DAILY  Qty:  30 tab(s)        Days Supply:  30        Refills:  0          Substitutions Allowed     Route To [facility]   Note from Pharmacy:  Patient needs more than 90 days due to being out of town  Signed by Chau WOOTEN  Maddie            ** Not Approved: Patient needs appointment **  atorvastatin (atorvastatin 40 mg tablet)  TAKE ONE TABLET BY MOUTH DAILY  Qty:  30 tab(s)        Days Supply:  30        Refills:  0          Substitutions Allowed     Route To Pharmacy - [facility]   Note from Pharmacy:  Patient needs more than 90 days due to being out of town  Signed by Maddie Ritchie CMA            ** Not Approved: Patient needs appointment **  amLODIPine (amlodipine 5 mg tablet)  TAKE ONE TABLET BY MOUTH DAILY  Qty:  30 tab(s)        Days Supply:  30        Refills:  0          Substitutions Allowed     Route To Pharmacy - [facility]   Note from Pharmacy:  Patient needs more than 90 days due to being out of town  Signed by Maddie Ritchie CMA          Entered by Maddie Ritchie CMA on November 05, 2020 12:38:48 PM CST  Time: 12:36 pm  Note: Spoke to Pharmacy and patient is upset because she wants more refills, [place]. They did notify her that she is due for an appointment before further refills can be given.       ------------------------------------------  From: [facility]  To: Colby Colunga PA-C  Sent: November 5, 2020 12:05:46 PM CST  Subject: Medication Management  Due: October 31, 2020 3:13:38 PM CDT     ** On Hold Pending Signature **     Dispensed Drug: amLODIPine (amLODIPine 5 mg oral tablet), TAKE ONE TABLET BY MOUTH DAILY  Quantity: 30 tab(s)  Days Supply: 30  Refills: 0  Substitutions Allowed  Notes from Pharmacy: Patient needs more than 90 days due to being out of town     ** On Hold Pending Signature **     Dispensed Drug: atorvastatin (atorvastatin 40 mg oral tablet), TAKE ONE TABLET BY MOUTH DAILY  Quantity: 30 tab(s)  Days Supply: 30  Refills: 0  Substitutions Allowed  Notes from Pharmacy: Patient needs more than 90 days due to being out of town     **  On Hold Pending Signature **     Dispensed Drug: buPROPion (buPROPion 75 mg oral tablet), TAKE ONE TABLET BY MOUTH DAILY  Quantity: 30 tab(s)  Days Supply: 30  Refills: 0  Substitutions Allowed  Notes from Pharmacy: Patient needs more than 90 days due to being out of town     ** On Hold Pending Signature **     Dispensed Drug: FLUoxetine (FLUoxetine 20 mg oral capsule), TAKE ONE CAPSULE BY MOUTH DAILY  Quantity: 30 cap(s)  Days Supply: 30  Refills: 0  Substitutions Allowed  Notes from Pharmacy: Patient needs more than 90 days due to being out of town     ** On Hold Pending Signature **     Dispensed Drug: lisinopril (lisinopril 20 mg oral tablet), TAKE ONE TABLET BY MOUTH DAILY  Quantity: 30 tab(s)  Days Supply: 30  Refills: 0  Substitutions Allowed  Notes from Pharmacy: Patient needs more than 90 days due to being out of town     ** On Hold Pending Signature **     Dispensed Drug: montelukast (montelukast 10 mg oral tablet), TAKE ONE TABLET BY MOUTH EVERY EVENING  Quantity: 30 tab(s)  Days Supply: 30  Refills: 0  Substitutions Allowed  Notes from Pharmacy: Patient needs more than 90 days due to being out of town  ------------------------------------------

## 2022-02-15 NOTE — TELEPHONE ENCOUNTER
---------------------  From: Stephen Nieves LPN   To: [facility] ([id number]_[facility] [place]);     Sent: 3/15/2021 8:18:54 AM CDT  Subject: General Message     Phone message    PCP:   Mateo Rosas     Time of Call:  _ 805       Person Calling:  Walter Sauceda  Phone number:  _     Note: Son calling.  Two main concerns.  Says the patient is having trouble with anxiety.  Says she is taking certain medications that may cause/increase anxiety.  Mother is in a memory care unit and also is not taking all of her meds concsistently.  At thos time the patient is still managing her medications, but he would like staff to take over this.  He also wants her to start taking melatonin at night.        Last office visit and reason:  _ 2-22-21, HTN and anxiety---------------------  From: Ashlee Patel CMA ([facility] ([id number]_[facility] [place]))   To: Joe Rosas MD;     Sent: 3/15/2021 10:26:47 AM CDT  Subject: FW: General MessageTried calling son. Message says call can't be completed as dialed. Will try again tomorrow.  If calls back, I am okay with [facility] giving her her medications and that would likely help. Not sure which medication he is mentioning that causes anxiety. I think melatonin would be a good thing to try.see other message
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
## 2022-02-15 NOTE — TELEPHONE ENCOUNTER
Entered by Fannie Cadet CMA on November 03, 2020 7:45:53 AM CST  ---------------------  From: Fannie Cadet CMA   To: [facility]    Sent: 11/3/2020 7:45:53 AM CST  Subject: Medication Management     ** Submitted: **  Order:montelukast (montelukast 10 mg oral tablet)  1 tab(s)  Oral  qpm  Qty:  30 tab(s)        Refills:  0          Substitutions Allowed     Route To [facility]    Signed by Fannie Cadet CMA  11/3/2020 1:45:00 PM UTC    ** Submitted: **  Complete:montelukast (montelukast 10 mg oral tablet)   Signed by Fannie Cadet CMA  11/3/2020 1:45:00 PM UTC    ** Not Approved:  **  montelukast (montelukast 10 mg tablet)  TAKE ONE TABLET BY MOUTH EVERY EVENING  Qty:  90 tab(s)        Days Supply:  60        Refills:  0          Substitutions Allowed     Route To [facility]   Signed by Fannie Cadet CMA            ** Submitted: **  Order:lisinopril (lisinopril 20 mg oral tablet)  1 tab(s)  Oral  daily  Qty:  30 tab(s)        Refills:  0          Substitutions Allowed     Route To [facility]    Signed by Fannie Cadet CMA  11/3/2020 1:45:00 PM UTC    ** Submitted: **  Complete:lisinopril (lisinopril 20 mg oral tablet)   Signed by Fannie Cadet CMA  11/3/2020 1:45:00 PM UTC    ** Not Approved:  **  lisinopril (lisinopril 20 mg tablet)  TAKE ONE Tablet BY MOUTH EVERY DAY  Qty:  90 tab(s)        Days Supply:  60        Refills:  0          Substitutions Allowed     Route To [facility]   Signed by Fannie Cadet CMA            ** Submitted: **  Order:FLUoxetine (FLUoxetine 20 mg oral capsule)  1 cap(s)  Oral  daily  Qty:  30 cap(s)        Refills:  0          Substitutions Allowed     Route  To [facility]    Signed by Fannie Cadet CMA  11/3/2020 1:44:00 PM UTC    ** Submitted: **  Complete:FLUoxetine (FLUoxetine 20 mg oral capsule)   Signed by Fannie Cadet CMA  11/3/2020 1:45:00 PM UT    ** Not Approved:  **  FLUoxetine (fluoxetine 20 mg capsule)  TAKE ONE Capsule BY MOUTH EVERY DAY  Qty:  90 cap(s)        Days Supply:  60        Refills:  0          Substitutions Allowed     Route To [facility]   Signed by Fannie Cadet CMA            ** Submitted: **  Order:buPROPion (buPROPion 75 mg oral tablet)  1 tab(s)  Oral  daily  Qty:  30 tab(s)        Refills:  0          Substitutions Allowed     Route To [facility]    Signed by Fannie Cadet CMA  11/3/2020 1:44:00 PM UTC    ** Submitted: **  Complete:buPROPion (buPROPion 75 mg oral tablet)   Signed by Fannie Cadet CMA  11/3/2020 1:44:00 PM UT    ** Not Approved:  **  buPROPion (bupropion HCl 75 mg tablet)  TAKE ONE Tablet BY MOUTH EVERY DAY  Qty:  90 tab(s)        Days Supply:  60        Refills:  3          Substitutions Allowed     Route To [facility]   Signed by Fannie Cadet CMA            ** Submitted: **  Order:atorvastatin (atorvastatin 40 mg oral tablet)  1 tab(s)  Oral  daily  Qty:  30 tab(s)        Refills:  0          Substitutions Allowed     Route To [facility]    Signed by Fannie Cadet CMA  11/3/2020 1:44:00 PM UTC    ** Submitted: **  Complete:atorvastatin (atorvastatin 40 mg oral tablet)   Signed by Fannie Cadet CMA  11/3/2020 1:44:00 PM UTC    ** Not Approved:  **  atorvastatin (atorvastatin 40 mg tablet)  TAKE ONE Tablet BY MOUTH EVERY DAY  Qty:  90 tab(s)        Days Supply:  60         Refills:  0          Substitutions Allowed     Route To [facility]   Signed by Fannie Cadet CMA            ** Submitted: **  Order:amLODIPine (amLODIPine 5 mg oral tablet)  1 tab(s)  Oral  daily  Qty:  30 tab(s)        Refills:  0          Substitutions Allowed     Route To [facility]    Signed by Fannie Cadet CMA  11/3/2020 1:43:00 PM UT    ** Submitted: **  Complete:amLODIPine (amLODIPine 5 mg oral tablet)   Signed by Fanine Cadet CMA  11/3/2020 1:43:00 PM UT    ** Not Approved:  **  amLODIPine (amlodipine 5 mg tablet)  TAKE ONE Tablet BY MOUTH EVERY DAY  Qty:  90 tab(s)        Days Supply:  90        Refills:  0          Substitutions Allowed     Route To [facility]   Signed by Fannie Cadet CMA            ------------------------------------------  From: [facility]  To: Colby Colunga PA-C  Sent: November 2, 2020 10:15:28 AM CST  Subject: Medication Management  Due: October 31, 2020 3:13:38 PM CDT     ** On Hold Pending Signature **     Drug: buPROPion (buPROPion 75 mg oral tablet), 1 tab(s) Oral daily  Quantity: 90 tab(s)  Days Supply: 0  Refills: 2  Substitutions Allowed  Notes from Pharmacy:     Dispensed Drug: buPROPion (buPROPion 75 mg oral tablet), TAKE ONE Tablet BY MOUTH EVERY DAY  Quantity: 90 tab(s)  Days Supply: 60  Refills: 3  Substitutions Allowed  Notes from Pharmacy:     ** On Hold Pending Signature **     Drug: montelukast (montelukast 10 mg oral tablet), 1 tab(s) Oral qpm  Quantity: 90 tab(s)  Days Supply: 0  Refills: 2  Substitutions Allowed  Notes from Pharmacy:     Dispensed Drug: montelukast (montelukast 10 mg oral tablet), TAKE ONE TABLET BY MOUTH EVERY EVENING  Quantity: 90 tab(s)  Days Supply: 60  Refills: 0  Substitutions Allowed  Notes from Pharmacy:     ** On  Hold Pending Signature **     Drug: FLUoxetine (FLUoxetine 20 mg oral capsule), 1 cap(s) Oral daily  Quantity: 90 cap(s)  Days Supply: 0  Refills: 2  Substitutions Allowed  Notes from Pharmacy:     Dispensed Drug: FLUoxetine (FLUoxetine 20 mg oral capsule), TAKE ONE Capsule BY MOUTH EVERY DAY  Quantity: 90 cap(s)  Days Supply: 60  Refills: 0  Substitutions Allowed  Notes from Pharmacy:     ** On Hold Pending Signature **     Drug: lisinopril (lisinopril 20 mg oral tablet), 1 tab(s) Oral daily  Quantity: 90 tab(s)  Days Supply: 0  Refills: 2  Substitutions Allowed  Notes from Pharmacy:     Dispensed Drug: lisinopril (lisinopril 20 mg oral tablet), TAKE ONE Tablet BY MOUTH EVERY DAY  Quantity: 90 tab(s)  Days Supply: 60  Refills: 0  Substitutions Allowed  Notes from Pharmacy:     ** On Hold Pending Signature **     Drug: atorvastatin (atorvastatin 40 mg oral tablet), 1 tab(s) Oral daily  Quantity: 90 tab(s)  Days Supply: 0  Refills: 2  Substitutions Allowed  Notes from Pharmacy:     Dispensed Drug: atorvastatin (atorvastatin 40 mg oral tablet), TAKE ONE Tablet BY MOUTH EVERY DAY  Quantity: 90 tab(s)  Days Supply: 60  Refills: 0  Substitutions Allowed  Notes from Pharmacy:     ** On Hold Pending Signature **     Drug: amLODIPine (amLODIPine 5 mg oral tablet), 1 tab(s) Oral daily  Quantity: 90 tab(s)  Days Supply: 0  Refills: 2  Substitutions Allowed  Notes from Pharmacy:     Dispensed Drug: amLODIPine (amLODIPine 5 mg oral tablet), TAKE ONE Tablet BY MOUTH EVERY DAY  Quantity: 90 tab(s)  Days Supply: 90  Refills: 0  Substitutions Allowed  Notes from Pharmacy:  ------------------------------------------11/19/19 AWANTONELLA, lab  Due this month  Protocol refills sent

## 2022-02-15 NOTE — NURSING NOTE
Comprehensive Intake Entered On:  4/12/2021 10:18 AM CDT    Performed On:  4/12/2021 10:16 AM CDT by Ashlee Patel CMA               Summary   Chief Complaint :   F/u anxiety and depression/recent med changes   Advance Directive :   Yes   Weight Measured :   179 lb(Converted to: 179 lb 0 oz, 81.193 kg)    Height Measured :   60 in(Converted to: 5 ft 0 in, 152.40 cm)    Body Mass Index :   34.95 kg/m2 (HI)    Body Surface Area :   1.85 m2   Height/Length Estimated :   60 in(Converted to: 5 ft 0 in, 152.40 cm)    Systolic Blood Pressure :   136 mmHg (HI)    Diastolic Blood Pressure :   89 mmHg (HI)    Mean Arterial Pressure :   105 mmHg   Peripheral Pulse Rate :   89 bpm   BP Site :   Right arm   BP Method :   Electronic   Temperature Tympanic :   96.8 DegF(Converted to: 36.0 DegC)  (LOW)    Oxygen Saturation :   97 %   Ashlee Patel CMA - 4/12/2021 10:16 AM CDT   Health Status   Allergies Verified? :   Yes   Medication History Verified? :   Yes   Immunizations Current :   Yes   Pre-Visit Planning Status :   Completed   Tobacco Use? :   Never smoker   Ashlee Patel CMA - 4/12/2021 10:16 AM CDT   Consents   Consent for Immunization Exchange :   Consent Granted   Consent for Immunizations to Providers :   Consent Granted   Ashlee Patel CMA - 4/12/2021 10:16 AM CDT   Meds / Allergies   (As Of: 4/12/2021 10:18:15 AM CDT)   Allergies (Active)   Actonel  Estimated Onset Date:   Unspecified ; Reactions:   Leg pain ; Created By:   Esperanza Mercado; Reaction Status:   Active ; Category:   Drug ; Substance:   Actonel ; Type:   Intolerance ; Updated By:   Esperanza Mercado; Reviewed Date:   4/12/2021 10:18 AM CDT      Evista  Estimated Onset Date:   Unspecified ; Reactions:   Leg cramps ; Created By:   Esperanza Mercado; Reaction Status:   Active ; Category:   Drug ; Substance:   Evista ; Type:   Intolerance ; Updated By:   Esperanza Mercado; Reviewed Date:   4/12/2021 10:18 AM CDT      simvastatin  Estimated Onset Date:   Unspecified ;  Reactions:   Leg Pain ; Created By:   Humaira Varner; Reaction Status:   Active ; Category:   Drug ; Substance:   simvastatin ; Type:   Side Effect ; Updated By:   Humaira Varner; Reviewed Date:   4/12/2021 10:18 AM CDT        Medication List   (As Of: 4/12/2021 10:18:15 AM CDT)   Prescription/Discharge Order    sertraline  :   sertraline ; Status:   Prescribed ; Ordered As Mnemonic:   sertraline 50 mg oral tablet ; Simple Display Line:   50 mg, 1 tab(s), Oral, daily, 30 tab(s), 5 Refill(s) ; Ordering Provider:   Joe Rosas MD; Catalog Code:   sertraline ; Order Dt/Tm:   4/1/2021 1:22:57 PM CDT          lisinopril  :   lisinopril ; Status:   Prescribed ; Ordered As Mnemonic:   lisinopril 30 mg oral tablet ; Simple Display Line:   30 mg, 1 tab(s), Oral, daily, 90 tab(s), 3 Refill(s) ; Ordering Provider:   Joe Rosas MD; Catalog Code:   lisinopril ; Order Dt/Tm:   2/22/2021 11:57:58 AM CST          buPROPion  :   buPROPion ; Status:   Prescribed ; Ordered As Mnemonic:   buPROPion 150 mg/12 hours (SR) oral tablet, extended release ; Simple Display Line:   150 mg, 1 tab(s), Oral, daily, 90 tab(s), 3 Refill(s) ; Ordering Provider:   Joe Rosas MD; Catalog Code:   buPROPion ; Order Dt/Tm:   2/22/2021 10:43:29 AM CST          amLODIPine  :   amLODIPine ; Status:   Prescribed ; Ordered As Mnemonic:   amLODIPine 5 mg oral tablet ; Simple Display Line:   5 mg, 1 tab(s), Oral, daily, controlled on 5mg per phone note 3/17/21, 180 tab(s), 3 Refill(s) ; Ordering Provider:   Joe Rosas MD; Catalog Code:   amLODIPine ; Order Dt/Tm:   12/10/2020 10:01:36 AM CST          atorvastatin  :   atorvastatin ; Status:   Prescribed ; Ordered As Mnemonic:   atorvastatin 40 mg oral tablet ; Simple Display Line:   1 tab(s), Oral, daily, 90 tab(s), 3 Refill(s) ; Ordering Provider:   Joe Rosas MD; Catalog Code:   atorvastatin ; Order Dt/Tm:   12/10/2020 10:01:35 AM CST          donepezil  :   donepezil ; Status:    Prescribed ; Ordered As Mnemonic:   donepezil 5 mg oral tablet ; Simple Display Line:   1 tab(s), Oral, qhs, 90 tab(s), 3 Refill(s) ; Ordering Provider:   Joe Rosas MD; Catalog Code:   donepezil ; Order Dt/Tm:   12/10/2020 10:01:34 AM CST          montelukast  :   montelukast ; Status:   Prescribed ; Ordered As Mnemonic:   montelukast 10 mg oral tablet ; Simple Display Line:   1 tab(s), Oral, qpm, 90 tab(s), 3 Refill(s) ; Ordering Provider:   Joe Rosas MD; Catalog Code:   montelukast ; Order Dt/Tm:   12/10/2020 10:01:32 AM CST          Miscellaneous Rx Supply  :   Miscellaneous Rx Supply ; Status:   Prescribed ; Ordered As Mnemonic:   knee high moderate compression stockings ; Simple Display Line:   See Instructions, wear daily from morning until bedtime, 2 EA, 0 Refill(s) ; Ordering Provider:   Joe Rosas MD; Catalog Code:   Miscellaneous Rx Supply ; Order Dt/Tm:   5/11/2017 3:15:03 PM CDT          acetaminophen  :   acetaminophen ; Status:   Prescribed ; Ordered As Mnemonic:   acetaminophen 500 mg oral tablet ; Simple Display Line:   1-2tabs, po, q8 hrs, 60 tab(s), PRN: as needed for pain ; Ordering Provider:   Joe Rosas MD; Catalog Code:   acetaminophen ; Order Dt/Tm:   6/24/2014 12:43:45 PM CDT          multivitamin  :   multivitamin ; Status:   Prescribed ; Ordered As Mnemonic:   Multiple Vitamins oral capsule ; Simple Display Line:   1 cap(s), po, Daily, 90 cap(s) ; Ordering Provider:   Joe Rosas MD; Catalog Code:   multivitamin ; Order Dt/Tm:   6/24/2014 12:43:58 PM CDT            ID Risk Screen   Recent Travel History :   No recent travel   Family Member Travel History :   No recent travel   Other Exposure to Infectious Disease :   Unknown   COVID-19 Testing Status :   Positive COVID-19 test greater than 30 days ago   Ashlee Patel CMA - 4/12/2021 10:16 AM CDT   Provider pre-printed instructions given

## 2022-02-15 NOTE — TELEPHONE ENCOUNTER
---------------------  From: Lakesha Golden CMA (Phone Messages Pool ([id number]_[place]))   To: PhishMe Message Pool ([id number]_[facility]);     Sent: 4/1/2021 8:10:25 AM CDT  Subject: FW: Potential treatment for mental health and anxiety issues           ---------------------  From: TARA KIERSTEN  To: [facility]  Sent: 03/31/2021 07:18 p.m. CDT  Subject: Potential treatment for mental health and anxiety issues  This is Wong Souza.    My mother had been on her new medication levels for a couple weeks now but she is showing signs of anxiety and depression issues getting worse.  We are trying to get her set up with outpatient from [place] as they said they would take her on but it is getting to the point we may need to consider inpatient options like when she went to the location in [place] a few years ago.  I d like to talk to Dr. Hunter about how to proceed.  One thing for certain is we do not want her going to the facility in [facility] due to family members having bad experiences there.    Wong Souza  [phone] have resources for geriatric inpatient psych if needed ([facility] unit @ [facility])--2 that I know of.---------------------  From: Ashlee Patel CMA (PhishMe Message Pool ([id number]_[facility]))   To: Joe Rosas MD;     Sent: 4/1/2021 10:41:13 AM CDT  Subject: FW: Potential treatment for mental health and anxiety issuesI think she has been to [place] - I think that's what he meant by [facility] (I'm guessing it is spell check corrected)I talked to Wong. Patient is not suicidal, just not seeming to improve. Some days are better than others. Still hoping to get set up with outpatient psych but having a hard time. Will start on sertraline which patient took for a long time with good results after hospitalizations in 2014. Follow up in 2 weeks, sooner if concerns. Can go to ER if safety is an issue but Wong says there has been none of that this time.

## 2022-02-15 NOTE — TELEPHONE ENCOUNTER
---------------------  From: Lakesha Golden CMA (Phone Messages Pool ([id number]_[facility]))   Sent: 11/5/2020 1:11:07 PM CST  Subject: Medication refill     Phone message    PCP:  FREDDIE    Person calling: Rebecca Long at [facility]  Phone number: [phone]  Time message left: 1203  Return call time:     Reason: Rebecca from [facility] called and left a message stating that Marilyn was only given a 30 day supply  of medications and pt told her that she will need a bigger quantity as she is going out of town.     Called and spoke with Rebecca informed her that pt was only given 30 day supply as she is due for a visit  she stated that she will call pt and let her know and have her call to schedule in office or video apt.       Transferred to:             MEHUL Golden CMA

## 2022-02-15 NOTE — NURSING NOTE
Comprehensive Intake Entered On:  2/18/2021 1:13 PM CST    Performed On:  2/18/2021 1:12 PM CST by Mercedes Crews CMA   Chief Complaint :   consent for video visit.   Advance Directive :   Yes   Height/Length Estimated :   60 in(Converted to: 5 ft 0 in, 152.40 cm)    Mars SUGARKhushboora - 2/18/2021 1:12 PM CST   Health Status   Allergies Verified? :   Yes   Medication History Verified? :   Yes   Immunizations Current :   Yes   Medical History Verified? :   Yes   Tobacco Use? :   Never smoker   Mars Mercedes WOOTEN - 2/18/2021 1:12 PM CST   Consents   Consent for Immunization Exchange :   Consent Granted   Consent for Immunizations to Providers :   Consent Granted   Mercedes Crews CMA - 2/18/2021 1:12 PM CST   Meds / Allergies   (As Of: 2/18/2021 1:13:39 PM CST)   Allergies (Active)   Actonel  Estimated Onset Date:   Unspecified ; Reactions:   Leg pain ; Created By:   Esperanza Mercado; Reaction Status:   Active ; Category:   Drug ; Substance:   Actonel ; Type:   Intolerance ; Updated By:   Esperanza Mercado; Reviewed Date:   2/18/2021 1:13 PM CST      Evista  Estimated Onset Date:   Unspecified ; Reactions:   Leg cramps ; Created By:   Esperanza Mercado; Reaction Status:   Active ; Category:   Drug ; Substance:   Evista ; Type:   Intolerance ; Updated By:   Esperanza Mercado; Reviewed Date:   2/18/2021 1:13 PM CST      simvastatin  Estimated Onset Date:   Unspecified ; Reactions:   Leg Pain ; Created By:   Humaira Varner; Reaction Status:   Active ; Category:   Drug ; Substance:   simvastatin ; Type:   Side Effect ; Updated By:   Humaira Varner; Reviewed Date:   2/18/2021 1:13 PM CST        Medication List   (As Of: 2/18/2021 1:13:39 PM CST)   Prescription/Discharge Order    acetaminophen  :   acetaminophen ; Status:   Prescribed ; Ordered As Mnemonic:   acetaminophen 500 mg oral tablet ; Simple Display Line:   1-2tabs, po, q8 hrs, 60 tab(s), PRN: as needed for pain ; Ordering Provider:   Ralph  Joe BETTS; Catalog Code:   acetaminophen ; Order Dt/Tm:   6/24/2014 12:43:45 PM CDT          amLODIPine  :   amLODIPine ; Status:   Prescribed ; Ordered As Mnemonic:   amLODIPine 5 mg oral tablet ; Simple Display Line:   1 tab(s), Oral, daily, 90 tab(s), 3 Refill(s) ; Ordering Provider:   Joe Rosas MD; Catalog Code:   amLODIPine ; Order Dt/Tm:   12/10/2020 10:01:36 AM CST          atorvastatin  :   atorvastatin ; Status:   Prescribed ; Ordered As Mnemonic:   atorvastatin 40 mg oral tablet ; Simple Display Line:   1 tab(s), Oral, daily, 90 tab(s), 3 Refill(s) ; Ordering Provider:   Joe Rosas MD; Catalog Code:   atorvastatin ; Order Dt/Tm:   12/10/2020 10:01:35 AM CST          buPROPion  :   buPROPion ; Status:   Prescribed ; Ordered As Mnemonic:   buPROPion 75 mg oral tablet ; Simple Display Line:   1 tab(s), Oral, daily, 90 tab(s), 3 Refill(s) ; Ordering Provider:   Joe Rosas MD; Catalog Code:   buPROPion ; Order Dt/Tm:   12/10/2020 10:01:33 AM CST          donepezil  :   donepezil ; Status:   Prescribed ; Ordered As Mnemonic:   donepezil 5 mg oral tablet ; Simple Display Line:   1 tab(s), Oral, qhs, 90 tab(s), 3 Refill(s) ; Ordering Provider:   Joe Rosas MD; Catalog Code:   donepezil ; Order Dt/Tm:   12/10/2020 10:01:34 AM CST          lisinopril  :   lisinopril ; Status:   Prescribed ; Ordered As Mnemonic:   lisinopril 20 mg oral tablet ; Simple Display Line:   1 tab(s), Oral, daily, 90 tab(s), 3 Refill(s) ; Ordering Provider:   Joe Rosas MD; Catalog Code:   lisinopril ; Order Dt/Tm:   12/10/2020 10:01:31 AM CST          Miscellaneous Rx Supply  :   Miscellaneous Rx Supply ; Status:   Prescribed ; Ordered As Mnemonic:   knee high moderate compression stockings ; Simple Display Line:   See Instructions, wear daily from morning until bedtime, 2 EA, 0 Refill(s) ; Ordering Provider:   Joe Rosas MD; Catalog Code:   Miscellaneous Rx Supply ; Order Dt/Tm:   5/11/2017 3:15:03 PM  CDT          montelukast  :   montelukast ; Status:   Prescribed ; Ordered As Mnemonic:   montelukast 10 mg oral tablet ; Simple Display Line:   1 tab(s), Oral, qpm, 90 tab(s), 3 Refill(s) ; Ordering Provider:   Joe Rosas MD; Catalog Code:   montelukast ; Order Dt/Tm:   12/10/2020 10:01:32 AM CST          multivitamin  :   multivitamin ; Status:   Prescribed ; Ordered As Mnemonic:   Multiple Vitamins oral capsule ; Simple Display Line:   1 cap(s), po, Daily, 90 cap(s) ; Ordering Provider:   Joe Rosas MD; Catalog Code:   multivitamin ; Order Dt/Tm:   6/24/2014 12:43:58 PM CDT            ID Risk Screen   Recent Travel History :   No recent travel   Family Member Travel History :   No recent travel   Other Exposure to Infectious Disease :   Unknown   COVID-19 Testing Status :   No COVID-19 test performed   Mercedes Crews CMA - 2/18/2021 1:12 PM CST

## 2022-02-15 NOTE — TELEPHONE ENCOUNTER
---------------------  From: Nahomi Foley   To: Colby Colunga PA-C;     Sent: 11/23/2021 2:50:24 PM CST  Subject: Scheduling Management     Patient no showed appointment. Appointment was for a medication refill---------------------  From: Colby Colunga PA-C   To: Nahomi Foley;     Sent: 11/23/2021 2:53:00 PM CST  Subject: RE: Scheduling Management     Noted    KAH

## 2022-02-15 NOTE — TELEPHONE ENCOUNTER
---------------------  From: Mely Elias (Phone Messages Pool ([id number]_[facility]))   To: Joe Rosas MD;     Sent: 3/25/2021 8:16:14 AM CDT  Subject: FW: Referral request for mental health visits           ---------------------  From: TARA BURCH  To: [facility]  Sent: 03/25/2021 08:12 a.m. CDT  Subject: Referral request for mental health visits  Sammi this is Wong Burch.    My sister and I handle the POA for our mother and we have something we need for our mom.  She has been having mental health episodes lately and under her insurance we can have mental health outpatient visits from [facility] in [place].  Gume Duarte would be the nurse practitioner to do the visits and everything is set except for one item.  Can we get a referral from Dr. Hunter sent to that location?  I have previously talked to Dr. Hunter about my mom s conditions.  We are working as a team to rebalance her medication needs and this is something we can add to it that would help resolve some additional items.    If you have any questions or need anything from me you can reply here or call me at [phone].    thanks,    Wong  ** Submitted: **  Order:Referral (Request)  Details:  3/25/2021 9:01 AM CDT, Referred to: Behavioral Health, Referred to: [facility] per request from son, Generalized anxiety disorder         Signed by Joe Rosas MD  3/25/2021 2:01:00 PM [facility]---------------------  From: Joe Rosas MD   To: Referral Coordinators Pool ([id number]_[facility]);     Sent: 3/25/2021 9:02:16 AM CDT  Subject: RE: Referral request for mental health visits---------------------  From: Alethea Magana (Referral Coordinators Pool ([id number]_[facility]))   To: Joe Rosas MD;     Sent: 3/25/2021 9:29:25 AM CDT  Subject: RE: Referral request for mental health visits     We can send and try but last we were told they were not accepting outside referrals for mental  health due to limited provider availability.Please follow up with son if that is the case again. He seems to think my referral is the only issue. Thank you---------------------  From: Ralph BETTS [facility]   To: Referral Coordinators Pool ([id number]_[facility]);     Sent: 3/25/2021 9:30:31 AM CDT  Subject: RE: Referral request for mental health visitsWill do!Spoke to Wong, patient will be able to be seen, provider does go to care facility patient is at as well.

## 2022-02-15 NOTE — TELEPHONE ENCOUNTER
---------------------  From: Ashlee Patel CMA   To: Joe Rosas MD;     Sent: 10/15/2019 4:34:01 PM CDT  Subject: BP management     Pt presented for CSS only BP check on 10/4/19 and BP was 146/80. Not rechecked. Looks like BP has been elevated on more than once occasion. She is due to see you next month for a AWV.     Please advise if further f/u needed at this time    (Inserted Image. Unable to display)         Medications          *denotes recorded medication          FLUoxetine 20 mg oral capsule: 20 mg, 1 cap(s), po, daily, 90 cap(s), 3 Refill(s).          knee high moderate compression stockings: See Instructions, wear daily from morning until bedtime, 2 EA, 0 Refill(s).          acetaminophen 500 mg oral tablet: 1-2tabs, po, q8 hrs, 60 tab(s), PRN: as needed for pain.          atorvastatin 40 mg oral tablet: 40 mg, 1 tab(s), po, daily, 90 tab(s), 3 Refill(s).          buPROPion 75 mg oral tablet: 75 mg, 1 tab(s), po, daily, 90 tab(s), 3 Refill(s).          donepezil 5 mg oral tablet: 5 mg, 1 tab(s), po, hs, 90 tab(s), 3 Refill(s).          lisinopril 20 mg oral tablet: 20 mg, 1 tab(s), po, daily, 90 tab(s), 3 Refill(s).          montelukast 10 mg oral tablet: 10 mg, 1 tab(s), po, qpm, 90 tab(s), 3 Refill(s).          Multiple Vitamins oral capsule: 1 cap(s), po, Daily, 90 cap(s).          predniSONE 10 mg oral tablet: 4 tabs daily for 3 days taper bey 1 tab every 3 days, Oral, daily, 30 tab(s), 0 Refill(s).Recommend add amlodipine 5mg once a day. Recheck at AWV.  Please send 90 day supply to pharmacy of her choice.   Thanks---------------------  From: Joe Rosas MD   To: Phone Messages Pool ([id number]_[place] [facility]);     Sent: 10/15/2019 5:58:58 PM CDT  Subject: RE: BP managementReturned Call  Time: 7:05 pm  Note:  Called & left a message asking pt to call back regarding blood pressure.Return Call     Time: 10:10am    Note: Called and left message to call back.Returned Call  Time: 2:34 pm  Note:   Called & spoke with patient letting her know that KW is asking her to take a small dose of BP medication to help lower her BP a little bit more. Wanting her to start now and then see how her BP is at her visit next month for AWV. Pt understood and would be willing to try this. Pt handles her own medication at [facility] and would like to [facility] and they will ship the medication to her. She will call back to schedule for the AWV apt. Rx sent to pharmacy.

## 2022-02-15 NOTE — NURSING NOTE
Comprehensive Intake Entered On:  11/24/2021 2:34 PM CST    Performed On:  11/24/2021 2:29 PM CST by Lakesha Golden CMA               Summary   Chief Complaint :   Follow up anxiety/depression, discuss starting a new medication, had stopped Zoloft due to diarrhea verbal consent for telephone visit   Advance Directive :   Yes   Height/Length Estimated :   60 in(Converted to: 5 ft 0 in, 152.40 cm)    Lakesha Golden CMA - 11/24/2021 2:29 PM CST   Meds / Allergies   (As Of: 11/24/2021 2:34:20 PM CST)   Allergies (Active)   Actonel  Estimated Onset Date:   Unspecified ; Reactions:   Leg pain ; Created By:   Esperanza Mercado; Reaction Status:   Active ; Category:   Drug ; Substance:   Actonel ; Type:   Intolerance ; Updated By:   Esperanza Mercado; Reviewed Date:   11/24/2021 2:30 PM CST      Evista  Estimated Onset Date:   Unspecified ; Reactions:   Leg cramps ; Created By:   Esperanza Mercado; Reaction Status:   Active ; Category:   Drug ; Substance:   Evista ; Type:   Intolerance ; Updated By:   Esperanza Mercado; Reviewed Date:   11/24/2021 2:30 PM CST      simvastatin  Estimated Onset Date:   Unspecified ; Reactions:   Leg Pain ; Created By:   Humaira Varner; Reaction Status:   Active ; Category:   Drug ; Substance:   simvastatin ; Type:   Side Effect ; Updated By:   Humaira Varner; Reviewed Date:   11/24/2021 2:30 PM CST      Zoloft  Estimated Onset Date:   Unspecified ; Reactions:   Diarrhea ; Created By:   Lakesha Golden CMA; Reaction Status:   Active ; Category:   Drug ; Substance:   Zoloft ; Type:   Side Effect ; Updated By:   Lakesha Golden CMA; Reviewed Date:   11/24/2021 2:32 PM CST        Medication List   (As Of: 11/24/2021 2:34:20 PM CST)   Prescription/Discharge Order    acetaminophen  :   acetaminophen ; Status:   Prescribed ; Ordered As Mnemonic:   acetaminophen 500 mg oral tablet ; Simple Display Line:   1-2tabs, po, q8 hrs, 60 tab(s), PRN: as needed for pain ; Ordering Provider:   Joe Rosas MD;  Catalog Code:   acetaminophen ; Order Dt/Tm:   6/24/2014 12:43:45 PM CDT          amLODIPine  :   amLODIPine ; Status:   Prescribed ; Ordered As Mnemonic:   amLODIPine 5 mg oral tablet ; Simple Display Line:   5 mg, 1 tab(s), Oral, daily, controlled on 5mg per phone note 3/17/21, 90 tab(s), 1 Refill(s) ; Ordering Provider:   Joe Rosas MD; Catalog Code:   amLODIPine ; Order Dt/Tm:   11/10/2021 11:13:12 AM CST          atorvastatin  :   atorvastatin ; Status:   Prescribed ; Ordered As Mnemonic:   atorvastatin 40 mg oral tablet ; Simple Display Line:   1 tab(s), Oral, daily, 90 tab(s), 1 Refill(s) ; Ordering Provider:   Joe Rosas MD; Catalog Code:   atorvastatin ; Order Dt/Tm:   11/10/2021 11:13:12 AM CST          donepezil  :   donepezil ; Status:   Prescribed ; Ordered As Mnemonic:   donepezil 5 mg oral tablet ; Simple Display Line:   1 tab(s), Oral, qhs, 90 tab(s), 1 Refill(s) ; Ordering Provider:   Joe Rosas MD; Catalog Code:   donepezil ; Order Dt/Tm:   11/10/2021 11:13:14 AM CST          lisinopril  :   lisinopril ; Status:   Prescribed ; Ordered As Mnemonic:   lisinopril 30 mg oral tablet ; Simple Display Line:   30 mg, 1 tab(s), Oral, daily, 90 tab(s), 1 Refill(s) ; Ordering Provider:   Joe Rosas MD; Catalog Code:   lisinopril ; Order Dt/Tm:   11/10/2021 11:13:14 AM CST          Miscellaneous Rx Supply  :   Miscellaneous Rx Supply ; Status:   Prescribed ; Ordered As Mnemonic:   knee high moderate compression stockings ; Simple Display Line:   See Instructions, wear daily from morning until bedtime, 2 EA, 0 Refill(s) ; Ordering Provider:   Joe Rosas MD; Catalog Code:   Miscellaneous Rx Supply ; Order Dt/Tm:   5/11/2017 3:15:03 PM CDT          Miscellaneous Rx Supply  :   Miscellaneous Rx Supply ; Status:   Prescribed ; Ordered As Mnemonic:   lift chair ; Simple Display Line:   See Instructions, Lift chair for use in home; patient with difficulty getting up from chair but  ambulatory in her home., 1 EA, 0 Refill(s) ; Ordering Provider:   Joe Rosas MD; Catalog Code:   Miscellaneous Rx Supply ; Order Dt/Tm:   8/23/2021 9:14:11 AM CDT          montelukast  :   montelukast ; Status:   Prescribed ; Ordered As Mnemonic:   montelukast 10 mg oral tablet ; Simple Display Line:   1 tab(s), Oral, qpm, 90 tab(s), 1 Refill(s) ; Ordering Provider:   Joe Rosas MD; Catalog Code:   montelukast ; Order Dt/Tm:   11/10/2021 11:13:13 AM CST          multivitamin  :   multivitamin ; Status:   Prescribed ; Ordered As Mnemonic:   Multiple Vitamins oral capsule ; Simple Display Line:   1 cap(s), po, Daily, 90 cap(s) ; Ordering Provider:   Joe Rosas MD; Catalog Code:   multivitamin ; Order Dt/Tm:   6/24/2014 12:43:58 PM CDT

## 2022-02-15 NOTE — TELEPHONE ENCOUNTER
---------------------  From: Bree Rico LPN (Phone Messages Pool ([id number]_[place] [facility]))   To: [facility] Message Pool ([id number]_[facility]);     Sent: 3/16/2021 1:38:58 PM CDT  Subject: medication questions     Phone Message    PCP:   FREDDIE      Time of Call:  1:23pm       Person Calling:  son/POISABEL Back   Phone number:  [phone]    Returned call at: _    Note:   Wong calling stating he has some questions about pt's medications.     Wong says he knows there were changes made to pt's medications when she was in after her ER visit.    Wong questioning pt's Prozac. He says the pharmacy filled it but it was not listed on pt's discharge list from ER.  Per note 2/15 pt was going to wean off of it. Wong says pt has still been taking it every day. He says pt resides at [facility] in [place] and they have been sending him her medication lists of what she takes.    Wong is also questioning pt's BP med- he says her dose was increased from 5mg to 10mg at her last visit. Wong says pt suppose to be taking 2 tabs (5mg) but pt has only been taking 1 5mg tab daily because she thought her new Rx was for the 10mg.  He says her BP has been good/close to normal. Today BP was 131/70 with HR 72.    Wong informed FREDDIE out of clinic today and message will be forwarded for tomorrow.    Last office visit and reason:  2/22/21 hypertension, anxiety---------------------  From: Ashlee Patel CMA ([facility] Message Pool ([id number]_[facility]))   To: Joe Rosas MD;     Sent: 3/17/2021 9:43:04 AM CDT  Subject: FW: medication questionsTalked with son. Will schedule an appointment to discuss the anxiety

## 2022-02-15 NOTE — TELEPHONE ENCOUNTER
Entered by Fannie Cadet CMA on December 01, 2020 2:41:49 PM CST  ---------------------  From: Fannie Cadet CMA   To: [facility]  [place]    Sent: 12/1/2020 2:41:49 PM CST  Subject: Medication Management     ** Submitted: **  Order:lisinopril (lisinopril 20 mg oral tablet)  1 tab(s)  Oral  daily  Qty:  14 tab(s)        Refills:  0          Substitutions Allowed     Route To Pharmacy - [facility]  [facility]    Signed by Fannie Cadet CMA  12/1/2020 8:41:00 PM [facility]    ** Submitted: **  Complete:lisinopril (lisinopril 20 mg oral tablet)   Signed by Fannie Cadet CMA  12/1/2020 8:41:00 PM [place]    ** Not Approved:  **  lisinopril (lisinopril 20 mg tablet)  TAKE ONE TABLET BY MOUTH DAILY  Qty:  30 tab(s)        Days Supply:  30        Refills:  0          Substitutions Allowed     Route To Pharmacy - [facility]  [facility]   Signed by Fannie Cadet CMA            ------------------------------------------  From: [facility]  To: Colby Colunga PA-C  Sent: November 30, 2020 11:19:25 AM CST  Subject: Medication Management  Due: November 26, 2020 4:59:30 PM CST     ** On Hold Pending Signature **     Dispensed Drug: lisinopril (lisinopril 20 mg oral tablet), TAKE ONE TABLET BY MOUTH DAILY  Quantity: 30 tab(s)  Days Supply: 30  Refills: 0  Substitutions Allowed  Notes from Pharmacy:  ------------------------------------------11/19/19 AWV, lab  Due now
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
## 2022-02-15 NOTE — NURSING NOTE
Comprehensive Intake Entered On:  2021 10:35 AM CST    Performed On:  2021 10:27 AM CST by Maddie Penaloza LPN               Summary   Chief Complaint :   Follow up hospital, HTN    Advance Directive :   Yes   Weight Measured :   192 lb(Converted to: 192 lb 0 oz, 87.090 kg)    Height Measured :   60 in(Converted to: 5 ft 0 in, 152.40 cm)    Body Mass Index :   37.49 kg/m2 (HI)    Body Surface Area :   1.92 m2   Height/Length Estimated :   60 in(Converted to: 5 ft 0 in, 152.40 cm)    Systolic Blood Pressure :   146 mmHg (HI)    Diastolic Blood Pressure :   86 mmHg (HI)    Mean Arterial Pressure :   106 mmHg   Peripheral Pulse Rate :   90 bpm   BP Site :   Right arm   Pulse Site :   Radial artery   BP Method :   Manual   HR Method :   Manual   Maddie Penaloza LPN - 2021 10:27 AM CST   Health Status   Allergies Verified? :   Yes   Medication History Verified? :   Yes   Immunizations Current :   Yes   Pre-Visit Planning Status :   Completed   Hospitalized since last visit? :   Yes   ED? :   Yes   Date of Discharge :   2021 CST   Follow-up visit date :   2021 CST   Maddie Penaloza LPN - 2021 10:27 AM CST   Demographics   Last Name :   Harley   Address :   [address] [place] [address].   First Name :   Marilyn   Responsible Party Date of Birth () :   6/10/1950 CDT   City :   [place]   State :   [place]   Zip Code :   [address]   Maddie Penaloza LPN - 2021 10:27 AM CST   Providers Grid   Provider Name :    Gay/Dr. Ballesteros              Provider Specialty :    Eye care              Comments :    [place]                Maddie Penaloza LPN - 2021 10:27 AM CST         Ancillary Services Grid   Name :    [facility]              Type of Service :    Pharmacy              Location :    [place]                Maddie Penaloza LPN - 2021 10:27 AM CST         Consents   Consent for Immunization Exchange :   Consent Granted   Consent for Immunizations to Providers :   Consent Granted    Maddie Penaloza LPN - 2/22/2021 10:27 AM CST   Meds / Allergies   (As Of: 2/22/2021 10:35:39 AM CST)   Allergies (Active)   Actonel  Estimated Onset Date:   Unspecified ; Reactions:   Leg pain ; Created By:   Esperanza Mercado; Reaction Status:   Active ; Category:   Drug ; Substance:   Actonel ; Type:   Intolerance ; Updated By:   Esperanza Mercado; Reviewed Date:   2/18/2021 1:13 PM CST      Evista  Estimated Onset Date:   Unspecified ; Reactions:   Leg cramps ; Created By:   Esperanza Mercado; Reaction Status:   Active ; Category:   Drug ; Substance:   Evista ; Type:   Intolerance ; Updated By:   Esperanza Mercado; Reviewed Date:   2/18/2021 1:13 PM CST      simvastatin  Estimated Onset Date:   Unspecified ; Reactions:   Leg Pain ; Created By:   Humaira Varner; Reaction Status:   Active ; Category:   Drug ; Substance:   simvastatin ; Type:   Side Effect ; Updated By:   Humaira Varner; Reviewed Date:   2/18/2021 1:13 PM CST        Medication List   (As Of: 2/22/2021 10:35:39 AM CST)   Prescription/Discharge Order    acetaminophen  :   acetaminophen ; Status:   Prescribed ; Ordered As Mnemonic:   acetaminophen 500 mg oral tablet ; Simple Display Line:   1-2tabs, po, q8 hrs, 60 tab(s), PRN: as needed for pain ; Ordering Provider:   Joe Rosas MD; Catalog Code:   acetaminophen ; Order Dt/Tm:   6/24/2014 12:43:45 PM CDT          amLODIPine  :   amLODIPine ; Status:   Prescribed ; Ordered As Mnemonic:   amLODIPine 5 mg oral tablet ; Simple Display Line:   10 mg, 2 tab(s), Oral, daily, 180 tab(s), 3 Refill(s) ; Ordering Provider:   Issa Luz MD; Catalog Code:   amLODIPine ; Order Dt/Tm:   12/10/2020 10:01:36 AM CST          atorvastatin  :   atorvastatin ; Status:   Prescribed ; Ordered As Mnemonic:   atorvastatin 40 mg oral tablet ; Simple Display Line:   1 tab(s), Oral, daily, 90 tab(s), 3 Refill(s) ; Ordering Provider:   Joe Rosas MD; Catalog Code:   atorvastatin ; Order Dt/Tm:   12/10/2020 10:01:35 AM  CST          buPROPion  :   buPROPion ; Status:   Prescribed ; Ordered As Mnemonic:   buPROPion 75 mg oral tablet ; Simple Display Line:   1 tab(s), Oral, daily, 90 tab(s), 3 Refill(s) ; Ordering Provider:   Joe Rosas MD; Catalog Code:   buPROPion ; Order Dt/Tm:   12/10/2020 10:01:33 AM CST          donepezil  :   donepezil ; Status:   Prescribed ; Ordered As Mnemonic:   donepezil 5 mg oral tablet ; Simple Display Line:   1 tab(s), Oral, qhs, 90 tab(s), 3 Refill(s) ; Ordering Provider:   Joe Rosas MD; Catalog Code:   donepezil ; Order Dt/Tm:   12/10/2020 10:01:34 AM CST          lisinopril  :   lisinopril ; Status:   Prescribed ; Ordered As Mnemonic:   lisinopril 20 mg oral tablet ; Simple Display Line:   1 tab(s), Oral, daily, 90 tab(s), 3 Refill(s) ; Ordering Provider:   Joe Rosas MD; Catalog Code:   lisinopril ; Order Dt/Tm:   12/10/2020 10:01:31 AM CST          Miscellaneous Rx Supply  :   Miscellaneous Rx Supply ; Status:   Prescribed ; Ordered As Mnemonic:   knee high moderate compression stockings ; Simple Display Line:   See Instructions, wear daily from morning until bedtime, 2 EA, 0 Refill(s) ; Ordering Provider:   Joe Rosas MD; Catalog Code:   Miscellaneous Rx Supply ; Order Dt/Tm:   5/11/2017 3:15:03 PM CDT          montelukast  :   montelukast ; Status:   Prescribed ; Ordered As Mnemonic:   montelukast 10 mg oral tablet ; Simple Display Line:   1 tab(s), Oral, qpm, 90 tab(s), 3 Refill(s) ; Ordering Provider:   Joe Rosas MD; Catalog Code:   montelukast ; Order Dt/Tm:   12/10/2020 10:01:32 AM CST          multivitamin  :   multivitamin ; Status:   Prescribed ; Ordered As Mnemonic:   Multiple Vitamins oral capsule ; Simple Display Line:   1 cap(s), po, Daily, 90 cap(s) ; Ordering Provider:   Joe Rosas MD; Catalog Code:   multivitamin ; Order Dt/Tm:   6/24/2014 12:43:58 PM CDT            ID Risk Screen   Recent Travel History :   No recent travel   Family Member  Travel History :   No recent travel   Other Exposure to Infectious Disease :   Unknown   COVID-19 Testing Status :   No positive COVID-19 test   Maddie Penaloza LPN - 2/22/2021 10:27 AM CST
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
## 2022-02-15 NOTE — NURSING NOTE
Quick Intake Entered On:  10/4/2019 1:15 PM CDT    Performed On:  10/4/2019 1:15 PM CDT by Luna Mendoza MA               Summary   Chief Complaint :   Bp   Advance Directive :   Yes   Height/Length Estimated :   60 in(Converted to: 5 ft 0 in, 152.40 cm)    Systolic Blood Pressure :   146 mmHg (HI)    Diastolic Blood Pressure :   80 mmHg   Mean Arterial Pressure :   102 mmHg   Peripheral Pulse Rate :   72 bpm   BP Site :   Left arm   Pulse Site :   Radial artery   BP Method :   Manual   HR Method :   Manual   Luna Mendoza MA - 10/4/2019 1:15 PM CDT

## 2022-02-15 NOTE — NURSING NOTE
Medicare Visit Entered On:  11/19/2019 8:12 AM CST    Performed On:  11/19/2019 8:05 AM CST by Maddie Ritchie CMA               Summary   Chief Complaint :   AWV   Advance Directive :   Yes   Weight Measured :   190 lb(Converted to: 190 lb 0 oz, 86.18 kg)    Height Measured :   60 in(Converted to: 5 ft 0 in, 152.40 cm)    Body Mass Index :   37.1 kg/m2 (HI)    Body Surface Area :   1.91 m2   Height/Length Estimated :   60 in(Converted to: 5 ft 0 in, 152.40 cm)    Systolic Blood Pressure :   140 mmHg (HI)    Diastolic Blood Pressure :   84 mmHg (HI)    Mean Arterial Pressure :   103 mmHg   Peripheral Pulse Rate :   85 bpm   BP Site :   Left arm   BP Method :   Manual   HR Method :   Electronic   Temperature Temporal :   97.1 DegF(Converted to: 36.2 DegC)  (LOW)    Oxygen Saturation :   96 %   Maddie Ritchie CMA - 11/19/2019 8:05 AM CST   Health Status   Allergies Verified? :   Yes   Medication History Verified? :   Yes   Immunizations Current :   Yes   Medical History Verified? :   Yes   Pre-Visit Planning Status :   Completed   Tobacco Use? :   Never smoker   Maddie Ritchie CMA - 11/19/2019 8:05 AM CST   Consents   Consent for Immunization Exchange :   Consent Granted   Consent for Immunizations to Providers :   Consent Granted   Maddie Ritchie CMA - 11/19/2019 8:05 AM CST   Meds / Allergies   (As Of: 11/19/2019 8:12:56 AM CST)   Allergies (Active)   Actonel  Estimated Onset Date:   Unspecified ; Reactions:   Leg pain ; Created By:   Esperanza Mercado; Reaction Status:   Active ; Category:   Drug ; Substance:   Actonel ; Type:   Intolerance ; Updated By:   Esperanza Mercado; Reviewed Date:   11/19/2019 8:06 AM CST      Evista  Estimated Onset Date:   Unspecified ; Reactions:   Leg cramps ; Created By:   Esperanza Mercado; Reaction Status:   Active ; Category:   Drug ; Substance:   Evista ; Type:   Intolerance ; Updated By:   Esperanza Mercado; Reviewed Date:   11/19/2019 8:06 AM CST      simvastatin  Estimated Onset Date:    Unspecified ; Reactions:   Leg Pain ; Created By:   Humaira Varner CMA; Reaction Status:   Active ; Category:   Drug ; Substance:   simvastatin ; Type:   Side Effect ; Updated By:   Humaira Varner CMA; Reviewed Date:   11/19/2019 8:06 AM [facility]        Medication List   (As Of: 11/19/2019 8:12:56 AM [facility])   Prescription/Discharge Order    acetaminophen  :   acetaminophen ; Status:   Prescribed ; Ordered As Mnemonic:   acetaminophen 500 mg oral tablet ; Simple Display Line:   1-2tabs, po, q8 hrs, 60 tab(s), PRN: as needed for pain ; Ordering Provider:   Joe Rosas MD; Catalog Code:   acetaminophen ; Order Dt/Tm:   6/24/2014 12:43:45 PM CDT          amLODIPine  :   amLODIPine ; Status:   Prescribed ; Ordered As Mnemonic:   amLODIPine 5 mg oral tablet ; Simple Display Line:   5 mg, 1 tab(s), Oral, daily, 90 tab(s), 0 Refill(s) ; Ordering Provider:   Joe Rosas MD; Catalog Code:   amLODIPine ; Order Dt/Tm:   10/16/2019 2:37:01 PM CDT          atorvastatin  :   atorvastatin ; Status:   Prescribed ; Ordered As Mnemonic:   atorvastatin 40 mg oral tablet ; Simple Display Line:   40 mg, 1 tab(s), po, daily, 90 tab(s), 3 Refill(s) ; Ordering Provider:   Joe Rosas MD; Catalog Code:   atorvastatin ; Order Dt/Tm:   11/12/2018 12:03:02 PM CST          buPROPion  :   buPROPion ; Status:   Prescribed ; Ordered As Mnemonic:   buPROPion 75 mg oral tablet ; Simple Display Line:   75 mg, 1 tab(s), po, daily, 90 tab(s), 3 Refill(s) ; Ordering Provider:   Joe Rosas MD; Catalog Code:   buPROPion ; Order Dt/Tm:   11/12/2018 12:02:58 PM CST          donepezil  :   donepezil ; Status:   Prescribed ; Ordered As Mnemonic:   donepezil 5 mg oral tablet ; Simple Display Line:   5 mg, 1 tab(s), po, hs, 90 tab(s), 3 Refill(s) ; Ordering Provider:   Joe Rosas MD; Catalog Code:   donepezil ; Order Dt/Tm:   11/12/2018 12:03:00 PM CST          FLUoxetine  :   FLUoxetine ; Status:   Prescribed ; Ordered As Mnemonic:    FLUoxetine 20 mg oral capsule ; Simple Display Line:   20 mg, 1 cap(s), po, daily, 90 cap(s), 3 Refill(s) ; Ordering Provider:   Joe Rosas MD; Catalog Code:   FLUoxetine ; Order Dt/Tm:   11/12/2018 12:02:52 PM CST          lisinopril  :   lisinopril ; Status:   Prescribed ; Ordered As Mnemonic:   lisinopril 20 mg oral tablet ; Simple Display Line:   20 mg, 1 tab(s), po, daily, 90 tab(s), 3 Refill(s) ; Ordering Provider:   Joe Rosas MD; Catalog Code:   lisinopril ; Order Dt/Tm:   11/12/2018 12:02:56 PM CST          Miscellaneous Rx Supply  :   Miscellaneous Rx Supply ; Status:   Prescribed ; Ordered As Mnemonic:   knee high moderate compression stockings ; Simple Display Line:   See Instructions, wear daily from morning until bedtime, 2 EA, 0 Refill(s) ; Ordering Provider:   Joe Rosas MD; Catalog Code:   Miscellaneous Rx Supply ; Order Dt/Tm:   5/11/2017 3:15:03 PM CDT          montelukast  :   montelukast ; Status:   Prescribed ; Ordered As Mnemonic:   montelukast 10 mg oral tablet ; Simple Display Line:   10 mg, 1 tab(s), po, qpm, 90 tab(s), 3 Refill(s) ; Ordering Provider:   Joe Rosas MD; Catalog Code:   montelukast ; Order Dt/Tm:   11/12/2018 12:02:55 PM CST          multivitamin  :   multivitamin ; Status:   Prescribed ; Ordered As Mnemonic:   Multiple Vitamins oral capsule ; Simple Display Line:   1 cap(s), po, Daily, 90 cap(s) ; Ordering Provider:   Joe Rosas MD; Catalog Code:   multivitamin ; Order Dt/Tm:   6/24/2014 12:43:58 PM CDT          predniSONE  :   predniSONE ; Status:   Prescribed ; Ordered As Mnemonic:   predniSONE 10 mg oral tablet ; Simple Display Line:   4 tabs daily for 3 days taper bey 1 tab every 3 days, Oral, daily, 30 tab(s), 0 Refill(s) ; Ordering Provider:   Jadon Slater MD; Catalog Code:   predniSONE ; Order Dt/Tm:   7/19/2019 4:29:46 PM CDT            Social History   Social History   (As Of: 11/19/2019 8:12:56 AM CST)   Alcohol:  Denies Alcohol  Use      Never   (Last Updated: 10/23/2012 1:06:04 PM CDT by Christina Mitchell LPN)          Tobacco:  Denies Tobacco Use      Never   (Last Updated: 10/5/2011 8:50:54 AM CDT by Irma Daigle)   Never   (Last Updated: 2014 2:52:41 PM CDT by Christina Mitchell LPN)          Substance Abuse:  Denies Substance Abuse      Never   (Last Updated: 10/5/2011 8:51:01 AM CDT by Irma Daigle)          Employment/School:        Retired, Work/School description: Works for [employer].   (Last Updated: 10/23/2012 1:06:26 PM CDT by Christina Mitchell LPN)          Home/Environment:        Marital status: .   (Last Updated: 10/5/2011 8:51:16 AM CDT by Irma Daigle)   Living situation: Home with assistance.  Home equipment: Walker/Cane.  Risks in environment: Pool/Lake.   (Last Updated: 11/3/2016 10:18:35 AM CDT by Christina Mitchell LPN)          Nutrition/Health:        Type of diet: Regular.   (Last Updated: 10/23/2012 1:06:38 PM CDT by Christina Mitchell LPN)          Exercise:  Does not exercise      Exercise frequency: ..   (Last Updated: 11/15/2018 1:30:14 PM CST by Irma Daigle)          Sexual:        Sexual orientation: Heterosexual.   (Last Updated: 10/23/2012 1:06:46 PM CDT by Christina Mitchell LPN)          Other:         Comments:  2010 11:10 AM - Taty Shore CMA:  Agustin   [facility]   (Last Updated: 2010 11:10:09 AM CDT by Taty Shore CMA)            Geriatric Depression Screening   Geriatric Depression Satisfied Life :   Yes   Geriatric Depression Dropped Activities :   No   Geriatric Depression Life Empty :   No   Geriatric Depression Bored :   Yes   Geriatric Depression Good Spirits :   Yes   Geriatric Depression Afraid Bad Things :   No   Geriatric Depression Feel Happy :   Yes   Geriatric Depression Feel Helpless :   No   Geriatric Depression Prefer to Stay Home :   No   Geriatric Depression Memory Problems :   No   Geriatric Depression Wonderful Be Alive :   Yes   Geriatric  Depression Feel Worthless :   No   Geriatric Depression Situation Hopeless :   No   Geriatric Depression Others Better Off :   No   Geriatric Depression Full of Energy :   Yes   Geriatric Depression Total Score :   1    Inoester SUGARGeeMaddie - 11/19/2019 8:15 AM CST   Hearing and Vision Screening   Audiogram Result Right Ear :   Pass   Audiogram Result Left Ear :   Pass   Vision Screen Comments :   Eye Appt today   Inoester SUGAR Maddie - 11/19/2019 8:05 AM CST   Home Safety Screen   Emergency Numbers Kept/Updated :   Yes   Aware of Smoking Dangers :   No   Smoke Alarms/Fire Extinguisher Available :   Yes   Household Members Fire Safety Knowledge :   Yes   Firearms Unloaded and Secure :   No   Floor Rugs Removed or Fastened :   Yes   Mats in Bathtub/Shower :   Yes   Stairway Rails or Banisters :   Yes   Outdoor Clutter Safety :   Yes   Indoor Clutter Safety :   Yes   Electrical Cord Safety :   Yes   Maddie Ritchie CMA - 11/19/2019 8:15 AM CST   Painting Fall Risk   History of Fall in Last 3 Months Painting :   No   Maddie Ritchie CMA - 11/19/2019 8:15 AM CST   Functional Assessment   Focused Functional Assessment Grid   Bathing :   Independent (2)   Dressing :   Independent (2)   Toileting :   Independent (2)   Transferring Bed or Chair :   Independent (2)   Feeding :   Independent (2)   Maddie Ritchie CMA - 11/19/2019 8:15 AM CST   Capable of Shopping :   Yes   Capable of Walking :   Yes   Capable of Housekeeping :   Yes   Capable of Managing Medications :   Yes   Capable of Handling Finances :   Yes   Maddie Ritchie CMA - 11/19/2019 8:15 AM CST
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
## 2022-02-15 NOTE — LETTER
(Inserted Image. Unable to display)   June 11, 2021    TARA BURCH  [address] [address]            Dear TARA,      Thank you for selecting [facility] for your healthcare needs.    Our records indicate you are due for the following services:    Hypertension check ~ please remember to bring your at-home blood pressure readings with you to your appointment.     Fasting Lab Tests ~ Please do not eat or drink anything 10 hours prior to your scheduled appointment time.  (Water and any medications that you may need are allowed unless directed otherwise.)    If you had your labs done at another facility or with Direct Access Lab Testing at [facility], please bring in a copy of the results to your next visit, mail a copy, or drop off a copy of your results to your Healthcare Provider.    (FYI   Regarding office visits: In some instances, a video visit or telephone visit may be offered as an option.)    You are due for lab work and an office visit; please schedule the lab appointment 1 week before the office visit.  This will assure all results are available to discuss with your Healthcare Provider during your visit.    **It is very helpful if you bring your medication bottles to your appointment.  This assures we have all of your current medications, including strength and dosing information, documented accurately in your medical record.    To schedule an appointment or if you have further questions, please contact your clinic at [phone].      Powered by [employer]    Sincerely,    Joe Rosas MD

## 2022-02-15 NOTE — LETTER
(Inserted Image. Unable to display)   November 19, 2020      TARA BURCH  [address]  [place], [place] [id number]        Dear TARA,      Thank you for selecting [facility] for your healthcare needs.     Our records indicate you are due for the following services:    Annual Physical  Fasting Lab Tests ~ Please do not eat or drink anything 10 hours prior to your scheduled appointment time.                (Water and any medications that you may need are allowed unless directed otherwise.)    If you had your labs done at another facility or with Direct Access Lab Testinig at [facility], please bring in a copy of the results to your next visit, mail a copy, or drop off a copy of your results to your Healthcare Provider.    You are due for lab work and an office visit; please schedule the lab appointment 1 week before the office visit.  This will assure all results are available to discuss with your Healthcare Provider during your visit.    (FYI   Regarding office visits: In some instances, a video visit or telephone visit may be offered as an option.)    **It is very helpful if you bring your medication bottles to your appointment.  This assures we have all of your current medications, including strength and dosing information, documented accurately in your medical record.    To schedule an appointment or if you have further questions, please contact your clinic at [phone].       Powered by [employer]    Sincerely,    Colby Colunga PA-C

## 2022-02-15 NOTE — TELEPHONE ENCOUNTER
---------------------  From: Makenzie Parry RN (Phone Messages Pool ([id number]_[place]))   To: [facility] Message Pool ([id number]_[facility]);     Sent: 11/10/2021 10:20:17 AM CST  Subject: Extension     Phone message    PCP:   FREDDIE      Time of Call:  0852       Person Calling:  Pt  Phone number:  [phone] (no voice mail)    Returned call at: [phone]    Note:   Pt is wondering if she can have a 6 month extension on her meds due to COVID being in the building where she lives and being on lock down.    Last labs done 2/21/21 except for Lipids - those done 11/19/19    Verified meds with pt. Needs Lisinopril 30 mg, Amlodipine 5 mg, Atorvastatin 40 mg, Donepezil 5 mg, Montelukast 10 mg sent to [facility] in [place].    Please advise.      Last office visit and reason:  April 2021, anxiety---------------------  From: Renetta Hoffman CMA ([employer] Message Pool ([id number]_[facility]))   To: Joe Rosas MD;     Sent: 11/10/2021 10:55:14 AM CST  Subject: FW: Extension  ** Submitted: **  Order:donepezil (donepezil 5 mg oral tablet)  1 tab(s)  Oral  qhs  Qty:  90 tab(s)        Refills:  1          Substitutions Allowed     Route To Pharmacy - [facility]    Signed by Joe Rosas MD  11/10/2021 5:13:00 PM UT    ** Submitted: **  Order:lisinopril (lisinopril 30 mg oral tablet)  1 tab(s)  Oral  daily  Qty:  90 tab(s)        Refills:  1          Substitutions Allowed     Route To [facility]    Signed by Joe Rosas MD  11/10/2021 5:13:00 PM UT    ** Submitted: **  Order:amLODIPine (amLODIPine 5 mg oral tablet)  1 tab(s)  Oral  daily  controlled on 5mg per phone note 3/17/21  Qty:  90 tab(s)        Refills:  1          Substitutions Allowed     Route To Pharmacy - [facility]    Signed by Joe Rosas MD  11/10/2021 5:13:00  PM UT    ** Submitted: **  Order:atorvastatin (atorvastatin 40 mg oral tablet)  1 tab(s)  Oral  daily  Qty:  90 tab(s)        Refills:  1          Substitutions Allowed     Route To [facility]  [place]    Signed by Joe Rosas MD  11/10/2021 5:13:00 PM UT    ** Submitted: **  Order:montelukast (montelukast 10 mg oral tablet)  1 tab(s)  Oral  qpm  Qty:  90 tab(s)        Refills:  1          Substitutions Allowed     Route To [facility]  [place]    Signed by Joe Rosas MD  11/10/2021 5:13:00 PM UT        Patient is in assisted living. OK for 6 month extension of all.---------------------  From: Joe Rosas MD   To: [employer] Pool ([id number]_[facility]);     Sent: 11/10/2021 11:14:06 AM CST  Subject: RE: Extensionpt contacted at 1148 and advised

## 2022-02-15 NOTE — TELEPHONE ENCOUNTER
---------------------  From: Elyse Gerard CMA ([employer] Pool ([id number]_[place]))   To: Joe Rosas MD;     Sent: 6/1/2020 1:28:45 PM CDT  Subject: FW: Medication Management   Due Date/Time: 5/31/2020 9:16:00 AM CDT           ------------------------------------------  From: [facility]  To: Colby Colunga PA-C  Sent: May 30, 2020 9:16:02 AM CDT  Subject: Medication Management  Due: May 30, 2020 3:28:37 PM CDT     ** On Hold Pending Signature **     Drug: donepezil (donepezil 5 mg oral tablet), 1 tab(s) Oral hs  Quantity: 90 tab(s)  Days Supply: 0  Refills: 2  Substitutions Allowed  Notes from Pharmacy:     Dispensed Drug: donepezil (donepezil 5 mg oral tablet), TAKE ONE TABLET BY MOUTH AT BEDTIME  Quantity: 90 tab(s)  Days Supply: 90  Refills: 3  Substitutions Allowed  Notes from Pharmacy: This prescription was filled on 5/30/2020. Any refills authorized will be placed on file.  ---------------------------------------------------------------  From: Joe Rosas MD   To: [employer] [facility]    Sent: 6/1/2020 1:45:43 PM CDT  Subject: FW: Medication Management     ** Submitted: **  Complete:donepezil (donepezil 5 mg oral tablet)   Signed by Joe Rosas MD  6/1/2020 6:45:00 PM    ** Approved with modifications: **  donepezil  TAKE ONE TABLET BY MOUTH AT BEDTIME  Qty:  90 tab(s)        Refills:  3          Substitutions Allowed     Details:  90 tab(s), TAKE ONE TABLET BY MOUTH AT BEDTIME, Route to Pharmacy Electronically [facility] , 11/19/2019, 5/30/2020, 90, This prescription was filled on 5/30/2020. Any refills authorized will be placed on file., Colby Colunga PA-C      Route To Pharmacy - [facility]    Note from Pharmacy:  This prescription was filled on 5/30/2020. Any refills authorized will be placed on file.  Signed by Joe Rosas MD

## 2022-02-15 NOTE — NURSING NOTE
Phone Message    PCP:   FREDDIE      Time of Call:  11:29am       Person Calling:  Wild from [facility]  Phone number:  _    Returned call at: _    Note:   Wild LM stating that they did not receive the new rx for Lisinopril with the dose change of 30mg per pt. They state they did receive other refills from today. I resent the refill of the Lisinopril at 11:55am.    CMcRoberts CMA
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
## 2022-02-15 NOTE — TELEPHONE ENCOUNTER
---------------------  From: Rebecca Quiñones CMA   To: FREDDIE Message Pool ([id number]);     Sent: 11/22/2021 8:23:31 AM CST  Subject: Depression/Anxiety RENETTAI     PCP:   FREDDIE      Time of Call:  0810       Person Calling:  barney Back/POA  Phone number:  _    Returned call at: _    Note:   Son called wanting clarification if patient should be taking any depression/anxiety medication. Informed Wong through documentation, the Bupropion and Sertraline have been stopped. Patients diarrhea and weight loss have subsided following the discontinuation of Sertraline.   Wong states his mother is becoming increasingly agitated and depressed due to covid and her living situation. Patient is currently in [place] with her daughter for the holidays and due to positive covid in the living facility.  CSS advised patient and POA make at lease a telemedicine appointment to address concerns and for possibly restarting medication. He agreed and will call back to schedule    Last office visit and reason:  4/12/21 Anxiety w/ FREDDIE

## 2022-02-15 NOTE — TELEPHONE ENCOUNTER
---------------------  From: Laurel Murdock CMA   To: Viedea Pool ([id number]);     Sent: 8/20/2021 8:26:03 AM CDT  Subject: DME     Patient called and said Medicare will pay for a reclining lift chair. She has difficulty getting up from regular chair. She would like a prescription mailed to her or to her daughter.They will only cover lift mechanism, not actually chair so there will still be out of pocket cost. Will need to update most recent note with reasoning/dx as to why she cant sit up with regular chair and if once she is up she is ambulatory around her home.---------------------  From: Ashlee Patel CMA ([employer] Message Pool ([id number]))   To: Joe Rosas MD;     Sent: 8/23/2021 8:26:25 AM CDT  Subject: FW: DMEI will put through the order. If Medicare needs documentation at a visit, will need to have a visit. Last visit was in April.  ** Submitted: **  Order:Miscellaneous Rx Supply (lift chair)  See Instructions  Lift chair for use in home; patient with difficulty getting up from chair but ambulatory in her home.  Qty:  1 EA        Refills:  0          Substitutions Allowed     Print - [id number] (from [id number]) in session 11    Signed by Joe Rosas MD  8/23/2021 2:14:00 PM [facility]---------------------  From: Joe Rosas MD   To: Viedea Pool ([address]);     Sent: 8/23/2021 9:15:12 AM CDT  Subject: RE: DMEMailed to pt's daughter, Alicia Pope [facility] [id number] [facility] [address] [place] [address]. Marilyn notified and did let her know a visit with [employer] may be necessary. She expressed understanding.

## 2022-02-15 NOTE — TELEPHONE ENCOUNTER
---------------------  From: Stephen Nieves LPN   To: Appointment Pool ([id number]_[place]);     Sent: 3/15/2021 8:22:36 AM CDT  Subject: General Message     Phone message    PCP:   Mateo FRAZIER      Time of Call:  _ 805       Person Calling:  _ Torsten Back  Phone number:  _ [phone]    Returned call at: _    Note:   _ Patient son - POA would like to set-up portal access for his mothers account to help better manage her care.     Last office visit and reason:  _ 2-, HTN and anxietyLVM to call and set up

## 2022-02-15 NOTE — NURSING NOTE
Patient is due for an appointment with KWJACKLYN for her AWV and Fasting labs. Called and LM informing patient of this.     Elyse CAREVR CMA
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
## 2022-02-15 NOTE — TELEPHONE ENCOUNTER
---------------------  From: Sushma Escobar (Phone Messages Pool ([id number]))   To: [employer] [facility] ([address] [place] [address]);     Sent: 5/12/2021 8:53:12 AM CDT  Subject: sertraline     Phone Message    PCP: FREDDIE    Time of Call: 0849    Phone Number: [phone]    Returned call at: n/a    Note: Patient calls stating hat FREDDIE put pt on Sertraline 100mg and she states that it gives her the diarrhea. Patient requesting to go back to 50mg. Please advise     Pharmacy: [facility]     Last office visit and reason: 4/12/21 anxiety     Transferred to: [facility]---------------------  From: Ashlee Patel CMA ([employer] Message Pool ([id number]))   To: Joe Rosas MD;     Sent: 5/12/2021 10:13:40 AM CDT  Subject: FW: sertralineOK to decrease to 50mg. Please contact son to let him know as well.---------------------  From: Joe Rosas MD   To: [employer] ([id number]);     Sent: 5/12/2021 10:17:35 AM CDT  Subject: Med Management     ** Submitted: **  Order:sertraline (sertraline 50 mg oral tablet)  1 tab(s)  Oral  daily  Qty:  90 tab(s)        Refills:  3          Substitutions Allowed     Route To Pharmacy - [facility]    Signed by Joe Rosas MD  5/12/2021 3:17:00 PM [place]oke to [place] and she agreed-she will cut pills in half and understands on next fill it will be for a 50mg tab. I LMTCB for son (verbal consent given by patient). Please share with son pt didnt tolerate 100mg of Sertraline (diarrhea) and we are going to decrease back to 50mg qd.---------------------  From: Ashlee Patel CMA ([facility] Message Pool ([id number]))   To: Phone Messages Pool ([id number]_[place]);     Sent: 5/12/2021 12:46:11 PM CDT  Subject: RE: Med ManagementPatients son called and updated.

## 2022-02-15 NOTE — NURSING NOTE
Comprehensive Intake Entered On:  7/19/2019 4:26 PM CDT    Performed On:  7/19/2019 4:24 PM CDT by Mely Elias               Summary   Chief Complaint :   f/up cough, feels she is worse.    Advance Directive :   Yes   Weight Measured :   189.0 lb(Converted to: 189 lb 0 oz, 85.73 kg)    Systolic Blood Pressure :   175 mmHg (HI)    Diastolic Blood Pressure :   92 mmHg (HI)    Mean Arterial Pressure :   120 mmHg   Peripheral Pulse Rate :   89 bpm   BP Method :   Electronic   HR Method :   Electronic   Temperature Tympanic :   99.1 DegF(Converted to: 37.3 DegC)    Nely CLARK Mely - 7/19/2019 4:24 PM CDT   Health Status   Allergies Verified? :   Yes   Medication History Verified? :   Yes   Immunizations Current :   Yes   Tobacco Use? :   Never smoker   Mely Elias - 7/19/2019 4:24 PM CDT   Meds / Allergies   (As Of: 7/19/2019 4:26:04 PM CDT)   Allergies (Active)   Actonel  Estimated Onset Date:   Unspecified ; Reactions:   Leg pain ; Created By:   Esperanza Mercado; Reaction Status:   Active ; Category:   Drug ; Substance:   Actonel ; Type:   Intolerance ; Updated By:   Esperanza Mercado; Reviewed Date:   7/9/2019 7:05 PM CDT      Evista  Estimated Onset Date:   Unspecified ; Reactions:   Leg cramps ; Created By:   Esperanza Mercado; Reaction Status:   Active ; Category:   Drug ; Substance:   Evista ; Type:   Intolerance ; Updated By:   Esperanza Mercado; Reviewed Date:   7/9/2019 7:05 PM CDT      simvastatin  Estimated Onset Date:   Unspecified ; Reactions:   Leg Pain ; Created By:   Humaira Varner; Reaction Status:   Active ; Category:   Drug ; Substance:   simvastatin ; Type:   Side Effect ; Updated By:   Humaira Varner; Reviewed Date:   7/9/2019 7:05 PM CDT        Medication List   (As Of: 7/19/2019 4:26:04 PM CDT)   Prescription/Discharge Order    atorvastatin  :   atorvastatin ; Status:   Prescribed ; Ordered As Mnemonic:   atorvastatin 40 mg oral tablet ; Simple Display Line:   40 mg, 1 tab(s), po, daily, 90  tab(s), 3 Refill(s) ; Ordering Provider:   Joe Rosas MD; Catalog Code:   atorvastatin ; Order Dt/Tm:   11/12/2018 12:03:02 PM          donepezil  :   donepezil ; Status:   Prescribed ; Ordered As Mnemonic:   donepezil 5 mg oral tablet ; Simple Display Line:   5 mg, 1 tab(s), po, hs, 90 tab(s), 3 Refill(s) ; Ordering Provider:   Joe Rosas MD; Catalog Code:   donepezil ; Order Dt/Tm:   11/12/2018 12:03:00 PM          buPROPion  :   buPROPion ; Status:   Prescribed ; Ordered As Mnemonic:   buPROPion 75 mg oral tablet ; Simple Display Line:   75 mg, 1 tab(s), po, daily, 90 tab(s), 3 Refill(s) ; Ordering Provider:   Joe Rosas MD; Catalog Code:   buPROPion ; Order Dt/Tm:   11/12/2018 12:02:58 PM          lisinopril  :   lisinopril ; Status:   Prescribed ; Ordered As Mnemonic:   lisinopril 20 mg oral tablet ; Simple Display Line:   20 mg, 1 tab(s), po, daily, 90 tab(s), 3 Refill(s) ; Ordering Provider:   Joe Rosas MD; Catalog Code:   lisinopril ; Order Dt/Tm:   11/12/2018 12:02:56 PM          montelukast  :   montelukast ; Status:   Prescribed ; Ordered As Mnemonic:   montelukast 10 mg oral tablet ; Simple Display Line:   10 mg, 1 tab(s), po, qpm, 90 tab(s), 3 Refill(s) ; Ordering Provider:   Joe Rosas MD; Catalog Code:   montelukast ; Order Dt/Tm:   11/12/2018 12:02:55 PM          FLUoxetine  :   FLUoxetine ; Status:   Prescribed ; Ordered As Mnemonic:   FLUoxetine 20 mg oral capsule ; Simple Display Line:   20 mg, 1 cap(s), po, daily, 90 cap(s), 3 Refill(s) ; Ordering Provider:   Joe Rosas MD; Catalog Code:   FLUoxetine ; Order Dt/Tm:   11/12/2018 12:02:52 PM          Miscellaneous Rx Supply  :   Miscellaneous Rx Supply ; Status:   Prescribed ; Ordered As Mnemonic:   knee high moderate compression stockings ; Simple Display Line:   See Instructions, wear daily from morning until bedtime, 2 EA, 0 Refill(s) ; Ordering Provider:   Joe Rosas MD; Catalog Code:   Miscellaneous  Rx Supply ; Order Dt/Tm:   5/11/2017 3:15:03 PM          acetaminophen  :   acetaminophen ; Status:   Prescribed ; Ordered As Mnemonic:   acetaminophen 500 mg oral tablet ; Simple Display Line:   1-2tabs, po, q8 hrs, 60 tab(s), PRN: as needed for pain ; Ordering Provider:   Joe Rosas MD; Catalog Code:   acetaminophen ; Order Dt/Tm:   6/24/2014 12:43:45 PM          multivitamin  :   multivitamin ; Status:   Prescribed ; Ordered As Mnemonic:   Multiple Vitamins oral capsule ; Simple Display Line:   1 cap(s), po, Daily, 90 cap(s) ; Ordering Provider:   Joe Rosas MD; Catalog Code:   multivitamin ; Order Dt/Tm:   6/24/2014 12:43:58 PM

## 2022-02-15 NOTE — LETTER
(Inserted Image. Unable to display)   February 03, 2020      TARA BURCH  [address]   [place], [place] [id number]        Dear TARA,      Thank you for selecting [facility] ([facility]viously [facility]) for your healthcare needs.     Our records indicate you are due for the following services:    Annual Physical  Fasting Lab Tests ~ Please do not eat or drink anything 10 hours prior to your scheduled appointment time.                (Water and any medications that you may need are allowed unless directed otherwise.)    If you had your labs done at another facility or with [facility] at [facility], please bring in a copy of the results to your next visit, mail a copy, or drop off a copy of your results to your Healthcare Provider.    You are due for lab work and an office visit; please schedule the lab appointment 1 week before the office visit.  This will assure all results are available to discuss with your provider during your visit.    **It is very helpful if you bring your medication bottles to your appointment.  This assures we have all of your current medications, including strength and dosing information, documented accurately in your medical record.    To schedule an appointment or if you have further questions, please contact your primary clinic:   [facility]  [phone]   [facility]  [phone]             [facility]      [phone]      Powered by [email or web address]    Sincerely,    Joe Rosas M.D.
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
## 2022-02-15 NOTE — TELEPHONE ENCOUNTER
Entered by Elyse Gerard CMA on May 26, 2020 12:51:35 PM CDT  ---------------------  From: Elyse Gerard CMA   To: [facility]    Sent: 5/26/2020 12:51:35 PM CDT  Subject: Medication Management     ** Not Approved: Patient has requested refill too soon **  lisinopril  TAKE ONE Tablet BY MOUTH EVERY DAY  Qty:  90 tab(s)        Refills:  3          Substitutions Allowed     Details:  90 tab(s), TAKE ONE Tablet BY MOUTH EVERY DAY, [facility], 5/26/2020, 90, This prescription was filled on 5/26/2020. Any refills authorized will be placed on file., Colby Colunga PA-C      Route To Pharmacy [facility]   Note from Pharmacy:  This prescription was filled on 5/26/2020. Any refills authorized will be placed on file.  Signed by Elyse Gerard CMA            ** Not Approved: Patient has requested refill too soon **  FLUoxetine  TAKE ONE Capsule BY MOUTH EVERY DAY  Qty:  90 cap(s)        Refills:  0          Substitutions Allowed     Details:  90 cap(s), TAKE ONE Capsule BY MOUTH EVERY DAY, Route to Pharmacy [facility] , 11/19/2019, 5/26/2020, 90, This prescription was filled on 5/26/2020. Any refills authorized will be placed on file., Colby Colunga PA-C      Route To [facility]    Note from Pharmacy:  This prescription was filled on 5/26/2020. Any refills authorized will be placed on file.  Signed by Elyse Gerard CMA            ** Not Approved: Patient has requested refill too soon **  atorvastatin  TAKE ONE Tablet BY MOUTH EVERY DAY  Qty:  90 tab(s)        Refills:  0          Substitutions Allowed     Details:  90 tab(s), TAKE ONE Tablet BY MOUTH EVERY DAY, [facility] 11/19/2019, 5/26/2020, 90, This prescription was filled on 5/26/2020. Any refills authorized will be placed  on file., Colby Colunga PA-C      Route To Pharmacy - [facility]   Note from Pharmacy:  This prescription was filled on 5/26/2020. Any refills authorized will be placed on file.  Signed by Elyse Gerard CMA            ------------------------------------------  From: [facility]  To: Colby Colunga PA-C  Sent: May 26, 2020 10:38:58 AM CDT  Subject: Medication Management  Due: May 23, 2020 1:34:07 PM CDT     ** On Hold Pending Signature **     Drug: lisinopril (lisinopril 20 mg oral tablet), 1 tab(s) Oral daily  Quantity: 90 tab(s)  Days Supply: 0  Refills: 2  Substitutions Allowed  Notes from Pharmacy:     Dispensed Drug: lisinopril (lisinopril 20 mg oral tablet), TAKE ONE Tablet BY MOUTH EVERY DAY  Quantity: 90 tab(s)  Days Supply: 90  Refills: 3  Substitutions Allowed  Notes from Pharmacy: This prescription was filled on 5/26/2020. Any refills authorized will be placed on file.     ** On Hold Pending Signature **     Drug: atorvastatin (atorvastatin 40 mg oral tablet), 1 tab(s) Oral daily  Quantity: 90 tab(s)  Days Supply: 0  Refills: 2  Substitutions Allowed  Notes from Pharmacy:     Dispensed Drug: atorvastatin (atorvastatin 40 mg oral tablet), TAKE ONE Tablet BY MOUTH EVERY DAY  Quantity: 90 tab(s)  Days Supply: 90  Refills: 0  Substitutions Allowed  Notes from Pharmacy: This prescription was filled on 5/26/2020. Any refills authorized will be placed on file.     ** On Hold Pending Signature **     Drug: FLUoxetine (FLUoxetine 20 mg oral capsule), 1 cap(s) Oral daily  Quantity: 90 cap(s)  Days Supply: 0  Refills: 2  Substitutions Allowed  Notes from Pharmacy:     Dispensed Drug: FLUoxetine (FLUoxetine 20 mg oral capsule), TAKE ONE Capsule BY MOUTH EVERY DAY  Quantity: 90 cap(s)  Days Supply: 90  Refills: 0  Substitutions Allowed  Notes from Pharmacy: This prescription was filled on 5/26/2020. Any refills authorized will be placed on  file.  ------------------------------------------
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
## 2022-02-15 NOTE — LETTER
(Inserted Image. Unable to display)   [address] [place]  [address]  [phone]    November 20, 2019      TARA BURCH      [address]   [place], [place] [id number]        Dear TARA,    Thank you for selecting [facility] for your healthcare needs. Below you will find the result of your recent test(s) done at our clinic.     Results are essentially stable. See you in one year.      Result Name Current Result Previous Result Reference Range   Sodium Level (mmol/L)  139 11/19/2019  139 7/24/2018 135 - 146   Potassium Level (mmol/L)  4.5 11/19/2019  4.5 7/24/2018 3.5 - 5.3   Chloride Level (mmol/L)  103 11/19/2019  105 7/24/2018 98 - 110   CO2 Level (mmol/L)  26 11/19/2019  26 7/24/2018 20 - 32   Glucose Level (mg/dL) ((H)) 101 11/19/2019  90 7/24/2018 65 - 99   BUN (mg/dL)  15 11/19/2019  15 7/24/2018 7 - 25   Creatinine Level (mg/dL)  0.78 11/19/2019  0.82 7/24/2018 0.60 - 0.93   Calcium Level (mg/dL) ((H)) 10.9 11/19/2019  10.0 7/24/2018 8.6 - 10.4   Cholesterol (mg/dL)  184 11/19/2019  170 11/12/2018  - <200   Non-HDL Cholesterol ((H)) 132 11/19/2019  119 11/12/2018  - <130   HDL (mg/dL)  52 11/19/2019  51 11/12/2018 >50 -    Cholesterol/HDL Ratio  3.5 11/19/2019  3.3 11/12/2018  - <5.0   LDL ((H)) 104 11/19/2019  91 11/12/2018    Triglyceride (mg/dL) ((H)) 161 11/19/2019 ((H)) 184 11/12/2018  - <150   WBC  6.6 11/19/2019  6.7 7/24/2018 3.8 - 10.8   RBC  3.99 11/19/2019  3.80 7/24/2018 3.80 - 5.10   Hgb (gm/dL)  13.4 11/19/2019  12.3 7/24/2018 11.7 - 15.5   Hct (%)  37.5 11/19/2019  36.3 7/24/2018 35.0 - 45.0   MCV (fL)  94.0 11/19/2019  95.5 7/24/2018 80.0 - 100.0   MCH (pg) ((H)) 33.6 11/19/2019  32.4 7/24/2018 27.0 - 33.0   MCHC (gm/dL)  35.7 11/19/2019  33.9 7/24/2018 32.0 - 36.0   RDW (%)  11.7 11/19/2019  11.7 7/24/2018 11.0 - 15.0   Platelet  315 11/19/2019  255 7/24/2018 140 - 400   MPV (fL)  10.6 11/19/2019  10.5 7/24/2018 7.5 - 12.5       Please contact my  practice at [phone] if you have any questions or concerns.      Sincerely,        Jeovany Nelson MD ,   Yesi Rosas MD,   Colby Colunga PA-C

## 2022-03-02 NOTE — NURSING NOTE
Comprehensive Intake Entered On:  1/5/2022 9:05 AM CST    Performed On:  1/5/2022 9:02 AM CST by Maddie Penaloza LPN               Summary   Chief Complaint :   verbal consent given for telemed. follow up depression, medication refills.    Advance Directive :   Yes   Height/Length Estimated :   60 in(Converted to: 5 ft 0 in, 152.40 cm)    Maddie Penaloza LPN - 1/5/2022 9:02 AM CST   Health Status   Allergies Verified? :   Yes   Medication History Verified? :   Yes   Immunizations Current :   Yes   Pre-Visit Planning Status :   Completed   Maddie Penaloza LPN - 1/5/2022 9:02 AM CST   Consents   Consent for Immunization Exchange :   Consent Granted   Consent for Immunizations to Providers :   Consent Granted   Maddie Penaloza LPN - 1/5/2022 9:02 AM CST   Meds / Allergies   (As Of: 1/5/2022 9:05:36 AM CST)   Allergies (Active)   Actonel  Estimated Onset Date:   Unspecified ; Reactions:   Leg pain ; Created By:   Esperanza Mercado; Reaction Status:   Active ; Category:   Drug ; Substance:   Actonel ; Type:   Intolerance ; Updated By:   Esperanza Mercado; Reviewed Date:   11/24/2021 2:30 PM CST      Evista  Estimated Onset Date:   Unspecified ; Reactions:   Leg cramps ; Created By:   Esperanza Mercado; Reaction Status:   Active ; Category:   Drug ; Substance:   Evista ; Type:   Intolerance ; Updated By:   Esperanza Mercado; Reviewed Date:   11/24/2021 2:30 PM CST      simvastatin  Estimated Onset Date:   Unspecified ; Reactions:   Leg Pain ; Created By:   Humaira Varner; Reaction Status:   Active ; Category:   Drug ; Substance:   simvastatin ; Type:   Side Effect ; Updated By:   Humaira Varner; Reviewed Date:   11/24/2021 2:30 PM CST      Zoloft  Estimated Onset Date:   Unspecified ; Reactions:   Diarrhea ; Created By:   Laeksha Golden CMA; Reaction Status:   Active ; Category:   Drug ; Substance:   Zoloft ; Type:   Side Effect ; Updated By:   Lakesha Golden CMA; Reviewed Date:   11/24/2021 2:32 PM CST         Medication List   (As Of: 1/5/2022 9:05:36 AM CST)   Prescription/Discharge Order    acetaminophen  :   acetaminophen ; Status:   Prescribed ; Ordered As Mnemonic:   acetaminophen 500 mg oral tablet ; Simple Display Line:   1-2tabs, po, q8 hrs, 60 tab(s), PRN: as needed for pain ; Ordering Provider:   Joe Rosas MD; Catalog Code:   acetaminophen ; Order Dt/Tm:   6/24/2014 12:43:45 PM CDT          amLODIPine  :   amLODIPine ; Status:   Prescribed ; Ordered As Mnemonic:   amLODIPine 5 mg oral tablet ; Simple Display Line:   5 mg, 1 tab(s), Oral, daily, controlled on 5mg per phone note 3/17/21, 90 tab(s), 1 Refill(s) ; Ordering Provider:   Joe Rosas MD; Catalog Code:   amLODIPine ; Order Dt/Tm:   11/10/2021 11:13:12 AM CST          atorvastatin  :   atorvastatin ; Status:   Prescribed ; Ordered As Mnemonic:   atorvastatin 40 mg oral tablet ; Simple Display Line:   1 tab(s), Oral, daily, 90 tab(s), 1 Refill(s) ; Ordering Provider:   Joe Rosas MD; Catalog Code:   atorvastatin ; Order Dt/Tm:   11/10/2021 11:13:12 AM CST          donepezil  :   donepezil ; Status:   Prescribed ; Ordered As Mnemonic:   donepezil 5 mg oral tablet ; Simple Display Line:   1 tab(s), Oral, qhs, 90 tab(s), 1 Refill(s) ; Ordering Provider:   Joe Rosas MD; Catalog Code:   donepezil ; Order Dt/Tm:   11/10/2021 11:13:14 AM CST          escitalopram  :   escitalopram ; Status:   Prescribed ; Ordered As Mnemonic:   escitalopram 5 mg oral tablet ; Simple Display Line:   5 mg, 1 tab(s), Oral, daily, 30 tab(s), 1 Refill(s) ; Ordering Provider:   Joe Rosas MD; Catalog Code:   escitalopram ; Order Dt/Tm:   12/2/2021 2:56:31 PM CST          lisinopril  :   lisinopril ; Status:   Prescribed ; Ordered As Mnemonic:   lisinopril 30 mg oral tablet ; Simple Display Line:   30 mg, 1 tab(s), Oral, daily, 90 tab(s), 1 Refill(s) ; Ordering Provider:   Joe Rosas MD; Catalog Code:   lisinopril ; Order Dt/Tm:   11/10/2021  11:13:14 AM CST          Miscellaneous Rx Supply  :   Miscellaneous Rx Supply ; Status:   Prescribed ; Ordered As Mnemonic:   knee high moderate compression stockings ; Simple Display Line:   See Instructions, wear daily from morning until bedtime, 2 EA, 0 Refill(s) ; Ordering Provider:   Joe Rosas MD; Catalog Code:   Miscellaneous Rx Supply ; Order Dt/Tm:   5/11/2017 3:15:03 PM CDT          Miscellaneous Rx Supply  :   Miscellaneous Rx Supply ; Status:   Prescribed ; Ordered As Mnemonic:   lift chair ; Simple Display Line:   See Instructions, Lift chair for use in home; patient with difficulty getting up from chair but ambulatory in her home., 1 EA, 0 Refill(s) ; Ordering Provider:   Joe Rosas MD; Catalog Code:   Miscellaneous Rx Supply ; Order Dt/Tm:   8/23/2021 9:14:11 AM CDT          montelukast  :   montelukast ; Status:   Prescribed ; Ordered As Mnemonic:   montelukast 10 mg oral tablet ; Simple Display Line:   1 tab(s), Oral, qpm, 90 tab(s), 1 Refill(s) ; Ordering Provider:   Joe Rosas MD; Catalog Code:   montelukast ; Order Dt/Tm:   11/10/2021 11:13:13 AM CST          multivitamin  :   multivitamin ; Status:   Prescribed ; Ordered As Mnemonic:   Multiple Vitamins oral capsule ; Simple Display Line:   1 cap(s), po, Daily, 90 cap(s) ; Ordering Provider:   Joe Rosas MD; Catalog Code:   multivitamin ; Order Dt/Tm:   6/24/2014 12:43:58 PM CDT            Social History   Social History   (As Of: 1/5/2022 9:05:36 AM CST)   Alcohol:  Denies Alcohol Use      Never   (Last Updated: 10/23/2012 1:06:04 PM CDT by Christina Mitchell LPN)          Tobacco:  Denies Tobacco Use      Never (less than 100 in lifetime)   (Last Updated: 1/5/2022 9:02:55 AM CST by Maddie Penaloza LPN)          Electronic Cigarette/Vaping:  Denies Electronic Cigarette Use      Electronic Cigarette Use: Never.   (Last Updated: 1/5/2022 9:03:00 AM CST by Maddie Penaloza LPN)          Substance Abuse:  Denies Substance  Abuse      Never   (Last Updated: 10/5/2011 8:51:01 AM CDT by Irma Daigle)          Employment/School:        Retired, Work/School description: Works for [employer].   (Last Updated: 10/23/2012 1:06:26 PM CDT by Christina Mitchell LPN)          Home/Environment:        Marital status: .   (Last Updated: 10/5/2011 8:51:16 AM CDT by Irma Daigle)   Living situation: Home with assistance.  Home equipment: Walker/Cane.  Injuries/Abuse/Neglect in household: No.  Feels unsafe at home: No.  Family/Friends available for support: Yes.  Risks in environment: Pool/Lake.   (Last Updated: 2019 1:16:06 PM CST by Irma Daigle)          Nutrition/Health:        Type of diet: Regular.   (Last Updated: 10/23/2012 1:06:38 PM CDT by Christina Mitchell LPN)          Exercise:  Does not exercise      Exercise frequency: ..   (Last Updated: 11/15/2018 1:30:14 PM CST by Irma Daigle)          Sexual:        Sexual orientation: Heterosexual.   (Last Updated: 10/23/2012 1:06:46 PM CDT by Christina Mitchell LPN)   Sexually active: No.  History of STD: No.  History of sexual abuse: No.   (Last Updated: 2019 1:16:25 PM CST by Irma Daigle)          Other:         Comments:  2010 11:10 AM - Taty Shore CMA:  Agustin   CA lung   (Last Updated: 2010 11:10:09 AM CDT by Taty Shore CMA)
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
## 2022-05-09 DIAGNOSIS — F03.A0 MILD DEMENTIA (H): ICD-10-CM

## 2022-05-09 DIAGNOSIS — I10 PRIMARY HYPERTENSION: ICD-10-CM

## 2022-05-09 DIAGNOSIS — J30.2 SEASONAL ALLERGIES: ICD-10-CM

## 2022-05-09 DIAGNOSIS — E78.5 DYSLIPIDEMIA: Primary | ICD-10-CM

## 2022-05-09 PROBLEM — E78.00 PURE HYPERCHOLESTEROLEMIA: Status: ACTIVE | Noted: 2022-05-09

## 2022-05-09 PROBLEM — D25.9 UTERINE LEIOMYOMA: Status: ACTIVE | Noted: 2022-05-09

## 2022-05-09 PROBLEM — F41.1 GENERALIZED ANXIETY DISORDER: Status: ACTIVE | Noted: 2022-05-09

## 2022-05-09 RX ORDER — ATORVASTATIN CALCIUM 40 MG/1
TABLET, FILM COATED ORAL
Qty: 90 TABLET | Refills: 1 | Status: SHIPPED | OUTPATIENT
Start: 2022-05-09 | End: 2022-05-19

## 2022-05-09 RX ORDER — MONTELUKAST SODIUM 10 MG/1
TABLET ORAL
Qty: 90 TABLET | Refills: 1 | Status: SHIPPED | OUTPATIENT
Start: 2022-05-09 | End: 2022-05-19

## 2022-05-09 RX ORDER — AMLODIPINE BESYLATE 5 MG/1
TABLET ORAL
Qty: 90 TABLET | Refills: 1 | Status: SHIPPED | OUTPATIENT
Start: 2022-05-09 | End: 2022-05-19

## 2022-05-09 RX ORDER — LISINOPRIL 30 MG/1
TABLET ORAL
Qty: 90 TABLET | Refills: 1 | Status: SHIPPED | OUTPATIENT
Start: 2022-05-09 | End: 2022-05-19

## 2022-05-09 RX ORDER — DONEPEZIL HYDROCHLORIDE 5 MG/1
TABLET, FILM COATED ORAL
Qty: 90 TABLET | Refills: 1 | Status: SHIPPED | OUTPATIENT
Start: 2022-05-09 | End: 2022-05-19

## 2022-05-19 ENCOUNTER — OFFICE VISIT (OUTPATIENT)
Dept: FAMILY MEDICINE | Facility: CLINIC | Age: 77
End: 2022-05-19
Payer: COMMERCIAL

## 2022-05-19 VITALS
SYSTOLIC BLOOD PRESSURE: 130 MMHG | HEART RATE: 92 BPM | WEIGHT: 141.1 LBS | BODY MASS INDEX: 27.56 KG/M2 | DIASTOLIC BLOOD PRESSURE: 78 MMHG | OXYGEN SATURATION: 97 %

## 2022-05-19 DIAGNOSIS — F03.A0 MILD DEMENTIA (H): ICD-10-CM

## 2022-05-19 DIAGNOSIS — E83.52 HYPERCALCEMIA: ICD-10-CM

## 2022-05-19 DIAGNOSIS — F33.1 MODERATE EPISODE OF RECURRENT MAJOR DEPRESSIVE DISORDER (H): Primary | ICD-10-CM

## 2022-05-19 DIAGNOSIS — I10 PRIMARY HYPERTENSION: ICD-10-CM

## 2022-05-19 DIAGNOSIS — E78.5 DYSLIPIDEMIA: ICD-10-CM

## 2022-05-19 DIAGNOSIS — J30.2 SEASONAL ALLERGIES: ICD-10-CM

## 2022-05-19 LAB
ANION GAP SERPL CALCULATED.3IONS-SCNC: 6 MMOL/L (ref 3–14)
BUN SERPL-MCNC: 19 MG/DL (ref 7–30)
CALCIUM SERPL-MCNC: 10.8 MG/DL (ref 8.5–10.1)
CHLORIDE BLD-SCNC: 106 MMOL/L (ref 94–109)
CHOLEST SERPL-MCNC: 183 MG/DL
CO2 SERPL-SCNC: 30 MMOL/L (ref 20–32)
CREAT SERPL-MCNC: 0.66 MG/DL (ref 0.52–1.04)
FASTING STATUS PATIENT QL REPORTED: YES
GFR SERPL CREATININE-BSD FRML MDRD: 90 ML/MIN/1.73M2
GLUCOSE BLD-MCNC: 94 MG/DL (ref 70–99)
HDLC SERPL-MCNC: 66 MG/DL
LDLC SERPL CALC-MCNC: 90 MG/DL
NONHDLC SERPL-MCNC: 117 MG/DL
POTASSIUM BLD-SCNC: 4.1 MMOL/L (ref 3.4–5.3)
SODIUM SERPL-SCNC: 142 MMOL/L (ref 133–144)
TRIGL SERPL-MCNC: 135 MG/DL

## 2022-05-19 PROCEDURE — 36415 COLL VENOUS BLD VENIPUNCTURE: CPT | Performed by: FAMILY MEDICINE

## 2022-05-19 PROCEDURE — 96127 BRIEF EMOTIONAL/BEHAV ASSMT: CPT | Performed by: FAMILY MEDICINE

## 2022-05-19 PROCEDURE — 99214 OFFICE O/P EST MOD 30 MIN: CPT | Performed by: FAMILY MEDICINE

## 2022-05-19 PROCEDURE — 80061 LIPID PANEL: CPT | Performed by: FAMILY MEDICINE

## 2022-05-19 PROCEDURE — 80048 BASIC METABOLIC PNL TOTAL CA: CPT | Performed by: FAMILY MEDICINE

## 2022-05-19 RX ORDER — ESCITALOPRAM OXALATE 5 MG/1
5 TABLET ORAL DAILY
COMMUNITY
Start: 2022-04-18 | End: 2022-05-19

## 2022-05-19 RX ORDER — LISINOPRIL 30 MG/1
30 TABLET ORAL DAILY
Qty: 90 TABLET | Refills: 3 | Status: SHIPPED | OUTPATIENT
Start: 2022-05-19 | End: 2023-05-17

## 2022-05-19 RX ORDER — MONTELUKAST SODIUM 10 MG/1
1 TABLET ORAL EVERY MORNING
Qty: 90 TABLET | Refills: 3 | Status: SHIPPED | OUTPATIENT
Start: 2022-05-19 | End: 2023-05-17

## 2022-05-19 RX ORDER — DONEPEZIL HYDROCHLORIDE 5 MG/1
TABLET, FILM COATED ORAL
Qty: 90 TABLET | Refills: 3 | Status: SHIPPED | OUTPATIENT
Start: 2022-05-19 | End: 2023-05-17

## 2022-05-19 RX ORDER — ESCITALOPRAM OXALATE 5 MG/1
5 TABLET ORAL DAILY
Qty: 90 TABLET | Refills: 3 | Status: SHIPPED | OUTPATIENT
Start: 2022-05-19 | End: 2023-05-17

## 2022-05-19 RX ORDER — AMLODIPINE BESYLATE 5 MG/1
5 TABLET ORAL DAILY
Qty: 90 TABLET | Refills: 3 | Status: SHIPPED | OUTPATIENT
Start: 2022-05-19 | End: 2023-05-17

## 2022-05-19 RX ORDER — ATORVASTATIN CALCIUM 40 MG/1
40 TABLET, FILM COATED ORAL DAILY
Qty: 90 TABLET | Refills: 3 | Status: SHIPPED | OUTPATIENT
Start: 2022-05-19 | End: 2023-05-17

## 2022-05-19 ASSESSMENT — PATIENT HEALTH QUESTIONNAIRE - PHQ9
10. IF YOU CHECKED OFF ANY PROBLEMS, HOW DIFFICULT HAVE THESE PROBLEMS MADE IT FOR YOU TO DO YOUR WORK, TAKE CARE OF THINGS AT HOME, OR GET ALONG WITH OTHER PEOPLE: NOT DIFFICULT AT ALL
SUM OF ALL RESPONSES TO PHQ QUESTIONS 1-9: 2
SUM OF ALL RESPONSES TO PHQ QUESTIONS 1-9: 2

## 2022-05-19 NOTE — PROGRESS NOTES
Assessment & Plan     Moderate episode of recurrent major depressive disorder (H)  Symptoms are under excellent control currently and patient does not notice any symptoms.  Family similarly feels like she is doing well.  We will continue current dose of escitalopram  - escitalopram (LEXAPRO) 5 MG tablet; Take 1 tablet (5 mg) by mouth daily    Primary hypertension  Blood pressure is currently controlled.  Gets it checked once a month at her assisted living facility.  We will continue current regimen of medication.  Chemistry panel today  - amLODIPine (NORVASC) 5 MG tablet; Take 1 tablet (5 mg) by mouth daily  - lisinopril (ZESTRIL) 30 MG tablet; Take 1 tablet (30 mg) by mouth daily  - Basic metabolic panel; Future    Dyslipidemia  Tolerating atorvastatin well.  We will check lipid panel today.  Continue annual follow-up  - atorvastatin (LIPITOR) 40 MG tablet; Take 1 tablet (40 mg) by mouth daily  - Lipid panel reflex to direct LDL Fasting; Future    Mild dementia (H)  No significant change noted and patient has overall been stable for quite some time.  There was a component of this that may have been related to mental health but has continued to have some mild memory changes and so therefore we will continue on current medication as it is well-tolerated  - donepezil (ARICEPT) 5 MG tablet; TAKE ONE TABLET BY MOUTH AT BEDTIME    Seasonal allergies  Not noticing any symptoms while taking her montelukast.  Continue current regimen without changes  - montelukast (SINGULAIR) 10 MG tablet; Take 1 tablet (10 mg) by mouth every morning                 Return in about 1 year (around 5/19/2023) for Follow up, Routine preventive.    Joe Rosas MD  [facility]    Mike Sanders is a 76 year old who presents for the following health issues  accompanied by her son.        History of Present Illness       Mental Health Follow-up:  Patient presents to follow-up on Depression.Patient's  depression since last visit has been:  No change  The patient is not having other symptoms associated with depression.      Any significant life events: No  Patient is not feeling anxious or having panic attacks.  Patient has no concerns about alcohol or drug use.    She eats 2-3 servings of fruits and vegetables daily.She consumes 0 sweetened beverage(s) daily.She exercises with enough effort to increase her heart rate 10 to 19 minutes per day.  She exercises with enough effort to increase her heart rate 5 days per week.   She is taking medications regularly.    Today's PHQ-9         PHQ-9 Total Score: 2    PHQ-9 Q9 Thoughts of better off dead/self-harm past 2 weeks :   Not at all    How difficult have these problems made it for you to do your work, take care of things at home, or get along with other people: Not difficult at all     Patient with history of hypertension and hyperlipidemia.  Blood pressure has been well controlled.  Tolerating medication for blood pressure and cholesterol.  No concerns  History of depression and also dementia.  They have not noticed any issues with these recently.  PHQ-9 is reviewed as noted above.  Gets good support at her assisted living.  She does note that she has been working hard at being healthier and so has been exercising regularly and has lost quite a bit of weight.  Weight is well within normal range.          Review of Systems         Objective    /78   Pulse 92   Wt 64 kg (141 lb 1.6 oz)   SpO2 97%   BMI 27.56 kg/m    Body mass index is 27.56 kg/m .  Physical Exam   GENERAL: healthy, alert and no distress  EYES: Eyes grossly normal to inspection, PERRL and conjunctivae and sclerae normal  HENT: ear canals and TM's normal, nose and mouth without ulcers or lesions  NECK: no adenopathy, no asymmetry, masses, or scars and thyroid normal to palpation  RESP: lungs clear to auscultation - no rales, rhonchi or wheezes  CV: regular rate and rhythm, normal S1 S2, no S3  or S4, no murmur, click or rub, no peripheral edema and peripheral pulses strong

## 2022-05-19 NOTE — LETTER
May 20, 2022      Marilyn Souza  [address]   [place] [address]        Dear ,    We are writing to inform you of your test results.    Test results indicate you may require additional follow up, see comment below.   Overall labs are normal but calcium level is higher than expected I would recommend a lab visit in 3 months to recheck those levels. Please let me know if you have questions.    Resulted Orders   Basic metabolic panel   Result Value Ref Range    Sodium 142 133 - 144 mmol/L    Potassium 4.1 3.4 - 5.3 mmol/L    Chloride 106 94 - 109 mmol/L    Carbon Dioxide (CO2) 30 20 - 32 mmol/L    Anion Gap 6 3 - 14 mmol/L    Urea Nitrogen 19 7 - 30 mg/dL    Creatinine 0.66 0.52 - 1.04 mg/dL    Calcium 10.8 (H) 8.5 - 10.1 mg/dL    Glucose 94 70 - 99 mg/dL    GFR Estimate 90 >60 mL/min/1.73m2      Comment:      Effective December 21, 2021 eGFRcr in adults is calculated using the 2021 CKD-EPI creatinine equation which includes age and gender (Irene mccann al., NEJ, DOI: 10.1056/KQIAll1784500)   Lipid panel reflex to direct LDL Fasting   Result Value Ref Range    Cholesterol 183 <200 mg/dL    Triglycerides 135 <150 mg/dL    Direct Measure HDL 66 >=50 mg/dL    LDL Cholesterol Calculated 90 <=100 mg/dL    Non HDL Cholesterol 117 <130 mg/dL    Patient Fasting > 8hrs? Yes     Narrative    Cholesterol  Desirable:  <200 mg/dL    Triglycerides  Normal:  Less than 150 mg/dL  Borderline High:  150-199 mg/dL  High:  200-499 mg/dL  Very High:  Greater than or equal to 500 mg/dL    Direct Measure HDL  Female:  Greater than or equal to 50 mg/dL   Male:  Greater than or equal to 40 mg/dL    LDL Cholesterol  Desirable:  <100mg/dL  Above Desirable:  100-129 mg/dL   Borderline High:  130-159 mg/dL   High:  160-189 mg/dL   Very High:  >= 190 mg/dL    Non HDL Cholesterol  Desirable:  130 mg/dL  Above Desirable:  130-159 mg/dL  Borderline High:  160-189 mg/dL  High:  190-219 mg/dL  Very High:  Greater than or equal to 220  mg/dL       If you have any questions or concerns, please call the clinic at the number listed above.       Sincerely,      Joe Rosas MD

## 2022-08-24 ENCOUNTER — TELEPHONE (OUTPATIENT)
Dept: FAMILY MEDICINE | Facility: CLINIC | Age: 77
End: 2022-08-24

## 2022-08-24 NOTE — TELEPHONE ENCOUNTER
I called and spoke with Wong, patient's son. Informed him that patient's form was mailed out a couple weeks ago. I however will mail patient's form again today. Wong verbalized understanding.

## 2022-08-24 NOTE — TELEPHONE ENCOUNTER
Reason for Call:  Other Handicap renewal form    Detailed comments: Patient mailed form for renewal of handicap sticker about 2 weeks ago and has not heard anything or received the form back.  Son of patient is checking on status. Patient's handicapped sticker/card expires in Sept.  Please call son with status.    Phone Number Patient can be reached at: Other phone number:  [phone]- Son Wong    Best Time: any    Can we leave a detailed message on this number? YES    Call taken on 8/24/2022 at 8:07 AM by ISIAH LOPEZ

## 2023-05-17 ENCOUNTER — OFFICE VISIT (OUTPATIENT)
Dept: FAMILY MEDICINE | Facility: CLINIC | Age: 78
End: 2023-05-17
Payer: COMMERCIAL

## 2023-05-17 VITALS
WEIGHT: 130.9 LBS | HEART RATE: 72 BPM | HEIGHT: 59 IN | OXYGEN SATURATION: 99 % | TEMPERATURE: 97.3 F | SYSTOLIC BLOOD PRESSURE: 122 MMHG | BODY MASS INDEX: 26.39 KG/M2 | DIASTOLIC BLOOD PRESSURE: 76 MMHG | RESPIRATION RATE: 12 BRPM

## 2023-05-17 DIAGNOSIS — E78.5 DYSLIPIDEMIA: ICD-10-CM

## 2023-05-17 DIAGNOSIS — F03.A0 MILD DEMENTIA WITHOUT BEHAVIORAL DISTURBANCE, PSYCHOTIC DISTURBANCE, MOOD DISTURBANCE, OR ANXIETY, UNSPECIFIED DEMENTIA TYPE (H): ICD-10-CM

## 2023-05-17 DIAGNOSIS — F33.1 MODERATE EPISODE OF RECURRENT MAJOR DEPRESSIVE DISORDER (H): ICD-10-CM

## 2023-05-17 DIAGNOSIS — E83.52 HYPERCALCEMIA: ICD-10-CM

## 2023-05-17 DIAGNOSIS — J30.2 SEASONAL ALLERGIES: ICD-10-CM

## 2023-05-17 DIAGNOSIS — I10 PRIMARY HYPERTENSION: Primary | ICD-10-CM

## 2023-05-17 LAB
ANION GAP SERPL CALCULATED.3IONS-SCNC: 10 MMOL/L (ref 7–15)
BUN SERPL-MCNC: 19 MG/DL (ref 8–23)
CA-I BLD-MCNC: 5.5 MG/DL (ref 4.4–5.2)
CALCIUM SERPL-MCNC: 11.1 MG/DL (ref 8.8–10.2)
CHLORIDE SERPL-SCNC: 105 MMOL/L (ref 98–107)
CHOLEST SERPL-MCNC: 165 MG/DL
CREAT SERPL-MCNC: 0.65 MG/DL (ref 0.51–0.95)
DEPRECATED HCO3 PLAS-SCNC: 28 MMOL/L (ref 22–29)
GFR SERPL CREATININE-BSD FRML MDRD: 90 ML/MIN/1.73M2
GLUCOSE SERPL-MCNC: 91 MG/DL (ref 70–99)
HDLC SERPL-MCNC: 70 MG/DL
LDLC SERPL CALC-MCNC: 77 MG/DL
NONHDLC SERPL-MCNC: 95 MG/DL
POTASSIUM SERPL-SCNC: 4 MMOL/L (ref 3.4–5.3)
SODIUM SERPL-SCNC: 143 MMOL/L (ref 136–145)
TRIGL SERPL-MCNC: 88 MG/DL

## 2023-05-17 PROCEDURE — 80061 LIPID PANEL: CPT | Performed by: FAMILY MEDICINE

## 2023-05-17 PROCEDURE — 80048 BASIC METABOLIC PNL TOTAL CA: CPT | Performed by: FAMILY MEDICINE

## 2023-05-17 PROCEDURE — 99214 OFFICE O/P EST MOD 30 MIN: CPT | Performed by: FAMILY MEDICINE

## 2023-05-17 PROCEDURE — 82330 ASSAY OF CALCIUM: CPT | Performed by: FAMILY MEDICINE

## 2023-05-17 PROCEDURE — 36415 COLL VENOUS BLD VENIPUNCTURE: CPT | Performed by: FAMILY MEDICINE

## 2023-05-17 RX ORDER — ATORVASTATIN CALCIUM 40 MG/1
40 TABLET, FILM COATED ORAL DAILY
Qty: 90 TABLET | Refills: 3 | Status: SHIPPED | OUTPATIENT
Start: 2023-05-17 | End: 2024-04-30

## 2023-05-17 RX ORDER — LISINOPRIL 30 MG/1
30 TABLET ORAL DAILY
Qty: 90 TABLET | Refills: 3 | Status: SHIPPED | OUTPATIENT
Start: 2023-05-17 | End: 2024-04-30

## 2023-05-17 RX ORDER — ESCITALOPRAM OXALATE 5 MG/1
5 TABLET ORAL DAILY
Qty: 90 TABLET | Refills: 3 | Status: SHIPPED | OUTPATIENT
Start: 2023-05-17 | End: 2024-04-30

## 2023-05-17 RX ORDER — MONTELUKAST SODIUM 10 MG/1
1 TABLET ORAL EVERY MORNING
Qty: 90 TABLET | Refills: 3 | Status: SHIPPED | OUTPATIENT
Start: 2023-05-17 | End: 2024-04-30

## 2023-05-17 RX ORDER — DONEPEZIL HYDROCHLORIDE 5 MG/1
TABLET, FILM COATED ORAL
Qty: 90 TABLET | Refills: 3 | Status: SHIPPED | OUTPATIENT
Start: 2023-05-17 | End: 2024-04-30

## 2023-05-17 RX ORDER — AMLODIPINE BESYLATE 5 MG/1
5 TABLET ORAL DAILY
Qty: 90 TABLET | Refills: 3 | Status: SHIPPED | OUTPATIENT
Start: 2023-05-17 | End: 2024-04-30

## 2023-05-17 ASSESSMENT — ENCOUNTER SYMPTOMS
CONSTITUTIONAL NEGATIVE: 1
ALLERGIC/IMMUNOLOGIC NEGATIVE: 1
EYES NEGATIVE: 1
GASTROINTESTINAL NEGATIVE: 1
CARDIOVASCULAR NEGATIVE: 1
NEUROLOGICAL NEGATIVE: 1
MUSCULOSKELETAL NEGATIVE: 1
RESPIRATORY NEGATIVE: 1
HEMATOLOGIC/LYMPHATIC NEGATIVE: 1
ENDOCRINE NEGATIVE: 1

## 2023-05-17 ASSESSMENT — PATIENT HEALTH QUESTIONNAIRE - PHQ9
10. IF YOU CHECKED OFF ANY PROBLEMS, HOW DIFFICULT HAVE THESE PROBLEMS MADE IT FOR YOU TO DO YOUR WORK, TAKE CARE OF THINGS AT HOME, OR GET ALONG WITH OTHER PEOPLE: NOT DIFFICULT AT ALL
SUM OF ALL RESPONSES TO PHQ QUESTIONS 1-9: 1
SUM OF ALL RESPONSES TO PHQ QUESTIONS 1-9: 1

## 2023-05-17 NOTE — PROGRESS NOTES
"  Assessment & Plan     Primary hypertension  Blood pressure is well controlled, continue current dosing, monitor blood pressures and if any symptoms of low blood pressure could back off on the dose of lisinopril.  Overall doing well, labs today  - lisinopril (ZESTRIL) 30 MG tablet; Take 1 tablet (30 mg) by mouth daily  - amLODIPine (NORVASC) 5 MG tablet; Take 1 tablet (5 mg) by mouth daily  - Basic metabolic panel; Future  - Lipid panel reflex to direct LDL Fasting; Future  - Lipid panel reflex to direct LDL Fasting    Moderate episode of recurrent major depressive disorder (H)  Patient with well-controlled depression, continue on current dose of escitalopram, no concerns from assisted living facility or son.  Patient reports she is doing well.  - escitalopram (LEXAPRO) 5 MG tablet; Take 1 tablet (5 mg) by mouth daily    Mild dementia without behavioral disturbance, psychotic disturbance, mood disturbance, or anxiety, unspecified dementia type (H)  No progression of dementia has been noticed.  Patient has been stable.  Continue current regimen.  - donepezil (ARICEPT) 5 MG tablet; TAKE ONE TABLET BY MOUTH AT BEDTIME    Dyslipidemia  Continue on atorvastatin which has been well-tolerated, lipid panel pending  - atorvastatin (LIPITOR) 40 MG tablet; Take 1 tablet (40 mg) by mouth daily    Hypercalcemia  Had elevated calcium level, will check ionized calcium  - Ionized Calcium; Future  - Basic metabolic panel  - Ionized Calcium    Seasonal allergies  Singulair has been effective, refilled for a year  - montelukast (SINGULAIR) 10 MG tablet; Take 1 tablet (10 mg) by mouth every morning             BMI:   Estimated body mass index is 26.04 kg/m  as calculated from the following:    Height as of this encounter: 1.51 m (4' 11.45\").    Weight as of this encounter: 59.4 kg (130 lb 14.4 oz).           Joe Rosas MD  [facility]    Mike Sanders is a 77 year old, presenting for the " following health issues:  Recheck Medication (Patient states her blood pressure was low, but she doesn't believe them.)        5/17/2023     8:59 AM   Additional Questions   Roomed by Felisha NARAYANAN CMA   Accompanied by Wong - Son     History of Present Illness       Hyperlipidemia:  She presents for follow up of hyperlipidemia.  She is taking medication to lower cholesterol. She is not having myalgia or other side effects to statin medications.    She eats 2-3 servings of fruits and vegetables daily.She consumes 0 sweetened beverage(s) daily.She exercises with enough effort to increase her heart rate 10 to 19 minutes per day.  She exercises with enough effort to increase her heart rate 3 or less days per week.   She is taking medications regularly.    Today's PHQ-9         PHQ-9 Total Score: 1    PHQ-9 Q9 Thoughts of better off dead/self-harm past 2 weeks :   Not at all    How difficult have these problems made it for you to do your work, take care of things at home, or get along with other people: Not difficult at all     Patient is here for follow-up, continues to live in assisted living, has been doing well, no concerns per assisted-living staff.    Patient has no concerns or self.  Current medications are working well.  Does note blood pressure was running a little low.  This was asymptomatic.  Blood pressure here is stable.  Tolerating current medications.  We will continue on same dosing.    No trouble with atorvastatin.  We will do lab work today.    Did have elevated calcium on prior levels, will check ionized calcium today.    Mood has been stable, no concerns about memory or mood, current medications seem to be working well.        Review of Systems   Constitutional: Negative.    HENT: Negative.    Eyes: Negative.    Respiratory: Negative.    Cardiovascular: Negative.    Gastrointestinal: Negative.    Endocrine: Negative.    Breasts:  negative.    Genitourinary: Negative.    Musculoskeletal: Negative.   "  Skin: Negative.    Allergic/Immunologic: Negative.    Neurological: Negative.    Hematological: Negative.             Objective    /76   Pulse 72   Temp 97.3  F (36.3  C)   Resp 12   Ht 1.51 m (4' 11.45\")   Wt 59.4 kg (130 lb 14.4 oz)   LMP  (LMP Unknown)   SpO2 99%   BMI 26.04 kg/m    Body mass index is 26.04 kg/m .  Physical Exam   GENERAL: healthy, alert and no distress  EYES: Eyes grossly normal to inspection, PERRL and conjunctivae and sclerae normal  HENT: ear canals and TM's normal, nose and mouth without ulcers or lesions  RESP: lungs clear to auscultation - no rales, rhonchi or wheezes  CV: regular rate and rhythm, normal S1 S2, no S3 or S4, no murmur, click or rub, no peripheral edema and peripheral pulses strong                    "

## 2023-05-17 NOTE — LETTER
May 23, 2023      Marilyn Souza  [address] [facility] [facility] [address]  [place] [address]        Dear ,    We are writing to inform you of your test results.    Labs are overall stable except your calcium level continues to be higher than expected. This can be related to hormone issues or from high doses of calcium or vitamin D supplements. I would recommend a consultation with one of our internal medicine physicians, Dr. Duggan or Dr. Bhatia, to discuss any additional workup that would be recommended. All other labs are normal.    Resulted Orders   Basic metabolic panel   Result Value Ref Range    Sodium 143 136 - 145 mmol/L    Potassium 4.0 3.4 - 5.3 mmol/L    Chloride 105 98 - 107 mmol/L    Carbon Dioxide (CO2) 28 22 - 29 mmol/L    Anion Gap 10 7 - 15 mmol/L    Urea Nitrogen 19.0 8.0 - 23.0 mg/dL    Creatinine 0.65 0.51 - 0.95 mg/dL    Calcium 11.1 (H) 8.8 - 10.2 mg/dL    Glucose 91 70 - 99 mg/dL    GFR Estimate 90 >60 mL/min/1.73m2      Comment:      eGFR calculated using 2021 CKD-EPI equation.   Ionized Calcium   Result Value Ref Range    Calcium Ionized 5.5 (H) 4.4 - 5.2 mg/dL   Lipid panel reflex to direct LDL Fasting   Result Value Ref Range    Cholesterol 165 <200 mg/dL    Triglycerides 88 <150 mg/dL    Direct Measure HDL 70 >=50 mg/dL    LDL Cholesterol Calculated 77 <=100 mg/dL    Non HDL Cholesterol 95 <130 mg/dL    Narrative    Cholesterol  Desirable:  <200 mg/dL    Triglycerides  Normal:  Less than 150 mg/dL  Borderline High:  150-199 mg/dL  High:  200-499 mg/dL  Very High:  Greater than or equal to 500 mg/dL    Direct Measure HDL  Female:  Greater than or equal to 50 mg/dL   Male:  Greater than or equal to 40 mg/dL    LDL Cholesterol  Desirable:  <100mg/dL  Above Desirable:  100-129 mg/dL   Borderline High:  130-159 mg/dL   High:  160-189 mg/dL   Very High:  >= 190 mg/dL    Non HDL Cholesterol  Desirable:  130 mg/dL  Above Desirable:  130-159 mg/dL  Borderline High:  160-189 mg/dL  High:  190-219  mg/dL  Very High:  Greater than or equal to 220 mg/dL       If you have any questions or concerns, please call the clinic at the number listed above.       Sincerely,      Joe Rosas MD

## 2024-04-30 ENCOUNTER — OFFICE VISIT (OUTPATIENT)
Dept: FAMILY MEDICINE | Facility: CLINIC | Age: 79
End: 2024-04-30
Payer: COMMERCIAL

## 2024-04-30 VITALS
BODY MASS INDEX: 24.82 KG/M2 | HEART RATE: 76 BPM | OXYGEN SATURATION: 98 % | SYSTOLIC BLOOD PRESSURE: 120 MMHG | DIASTOLIC BLOOD PRESSURE: 60 MMHG | TEMPERATURE: 97.4 F | HEIGHT: 59 IN | WEIGHT: 123.1 LBS

## 2024-04-30 DIAGNOSIS — I10 PRIMARY HYPERTENSION: ICD-10-CM

## 2024-04-30 DIAGNOSIS — F03.A0 MILD DEMENTIA WITHOUT BEHAVIORAL DISTURBANCE, PSYCHOTIC DISTURBANCE, MOOD DISTURBANCE, OR ANXIETY, UNSPECIFIED DEMENTIA TYPE (H): ICD-10-CM

## 2024-04-30 DIAGNOSIS — F33.1 MODERATE EPISODE OF RECURRENT MAJOR DEPRESSIVE DISORDER (H): ICD-10-CM

## 2024-04-30 DIAGNOSIS — J30.2 SEASONAL ALLERGIES: ICD-10-CM

## 2024-04-30 DIAGNOSIS — I10 ESSENTIAL HYPERTENSION: ICD-10-CM

## 2024-04-30 DIAGNOSIS — E83.52 HYPERCALCEMIA: Primary | ICD-10-CM

## 2024-04-30 DIAGNOSIS — E78.5 DYSLIPIDEMIA: ICD-10-CM

## 2024-04-30 DIAGNOSIS — E78.2 MIXED HYPERLIPIDEMIA: ICD-10-CM

## 2024-04-30 LAB
ANION GAP SERPL CALCULATED.3IONS-SCNC: 10 MMOL/L (ref 7–15)
BUN SERPL-MCNC: 17 MG/DL (ref 8–23)
CA-I BLD-MCNC: 5.6 MG/DL (ref 4.4–5.2)
CALCIUM SERPL-MCNC: 11.1 MG/DL (ref 8.8–10.2)
CHLORIDE SERPL-SCNC: 102 MMOL/L (ref 98–107)
CHOLEST SERPL-MCNC: 160 MG/DL
CREAT SERPL-MCNC: 0.71 MG/DL (ref 0.51–0.95)
DEPRECATED HCO3 PLAS-SCNC: 29 MMOL/L (ref 22–29)
EGFRCR SERPLBLD CKD-EPI 2021: 86 ML/MIN/1.73M2
FASTING STATUS PATIENT QL REPORTED: NORMAL
GLUCOSE SERPL-MCNC: 93 MG/DL (ref 70–99)
HDLC SERPL-MCNC: 77 MG/DL
LDLC SERPL CALC-MCNC: 66 MG/DL
NONHDLC SERPL-MCNC: 83 MG/DL
POTASSIUM SERPL-SCNC: 4.7 MMOL/L (ref 3.4–5.3)
SODIUM SERPL-SCNC: 141 MMOL/L (ref 135–145)
TRIGL SERPL-MCNC: 83 MG/DL
TSH SERPL DL<=0.005 MIU/L-ACNC: 2.62 UIU/ML (ref 0.3–4.2)

## 2024-04-30 PROCEDURE — 80048 BASIC METABOLIC PNL TOTAL CA: CPT | Performed by: PHYSICIAN ASSISTANT

## 2024-04-30 PROCEDURE — 82330 ASSAY OF CALCIUM: CPT | Performed by: PHYSICIAN ASSISTANT

## 2024-04-30 PROCEDURE — 36415 COLL VENOUS BLD VENIPUNCTURE: CPT | Performed by: PHYSICIAN ASSISTANT

## 2024-04-30 PROCEDURE — 80061 LIPID PANEL: CPT | Performed by: PHYSICIAN ASSISTANT

## 2024-04-30 PROCEDURE — 99214 OFFICE O/P EST MOD 30 MIN: CPT | Performed by: PHYSICIAN ASSISTANT

## 2024-04-30 PROCEDURE — 84443 ASSAY THYROID STIM HORMONE: CPT | Performed by: PHYSICIAN ASSISTANT

## 2024-04-30 RX ORDER — LISINOPRIL 30 MG/1
30 TABLET ORAL DAILY
Qty: 90 TABLET | Refills: 3 | OUTPATIENT
Start: 2024-04-30

## 2024-04-30 RX ORDER — ATORVASTATIN CALCIUM 40 MG/1
40 TABLET, FILM COATED ORAL DAILY
Qty: 90 TABLET | Refills: 3 | Status: SHIPPED | OUTPATIENT
Start: 2024-04-30

## 2024-04-30 RX ORDER — DONEPEZIL HYDROCHLORIDE 5 MG/1
TABLET, FILM COATED ORAL
Qty: 90 TABLET | Refills: 3 | Status: SHIPPED | OUTPATIENT
Start: 2024-04-30

## 2024-04-30 RX ORDER — ESCITALOPRAM OXALATE 5 MG/1
5 TABLET ORAL DAILY
Qty: 90 TABLET | Refills: 3 | OUTPATIENT
Start: 2024-04-30

## 2024-04-30 RX ORDER — ESCITALOPRAM OXALATE 5 MG/1
5 TABLET ORAL DAILY
Qty: 90 TABLET | Refills: 3 | Status: SHIPPED | OUTPATIENT
Start: 2024-04-30

## 2024-04-30 RX ORDER — AMLODIPINE BESYLATE 5 MG/1
5 TABLET ORAL DAILY
Qty: 90 TABLET | Refills: 3 | OUTPATIENT
Start: 2024-04-30

## 2024-04-30 RX ORDER — LISINOPRIL 30 MG/1
30 TABLET ORAL DAILY
Qty: 90 TABLET | Refills: 3 | Status: SHIPPED | OUTPATIENT
Start: 2024-04-30

## 2024-04-30 RX ORDER — MONTELUKAST SODIUM 10 MG/1
1 TABLET ORAL EVERY MORNING
Qty: 90 TABLET | Refills: 3 | Status: SHIPPED | OUTPATIENT
Start: 2024-04-30

## 2024-04-30 RX ORDER — ATORVASTATIN CALCIUM 40 MG/1
40 TABLET, FILM COATED ORAL DAILY
Qty: 90 TABLET | Refills: 3 | OUTPATIENT
Start: 2024-04-30

## 2024-04-30 RX ORDER — AMLODIPINE BESYLATE 5 MG/1
5 TABLET ORAL DAILY
Qty: 90 TABLET | Refills: 3 | Status: SHIPPED | OUTPATIENT
Start: 2024-04-30

## 2024-04-30 RX ORDER — MONTELUKAST SODIUM 10 MG/1
1 TABLET ORAL EVERY MORNING
Qty: 90 TABLET | Refills: 3 | OUTPATIENT
Start: 2024-04-30

## 2024-04-30 RX ORDER — DONEPEZIL HYDROCHLORIDE 5 MG/1
TABLET, FILM COATED ORAL
Qty: 90 TABLET | Refills: 3 | OUTPATIENT
Start: 2024-04-30

## 2024-04-30 ASSESSMENT — PATIENT HEALTH QUESTIONNAIRE - PHQ9
SUM OF ALL RESPONSES TO PHQ QUESTIONS 1-9: 1
10. IF YOU CHECKED OFF ANY PROBLEMS, HOW DIFFICULT HAVE THESE PROBLEMS MADE IT FOR YOU TO DO YOUR WORK, TAKE CARE OF THINGS AT HOME, OR GET ALONG WITH OTHER PEOPLE: NOT DIFFICULT AT ALL
SUM OF ALL RESPONSES TO PHQ QUESTIONS 1-9: 1

## 2024-04-30 NOTE — PROGRESS NOTES
Assessment & Plan     (E83.52) Hypercalcemia  (primary encounter diagnosis)  Comment: Stable  Plan: Basic metabolic panel, Ionized Calcium, TSH         with free T4 reflex        Labs pending    (F03.A0) Mild dementia without behavioral disturbance, psychotic disturbance, mood disturbance, or anxiety, unspecified dementia type (H)  Comment: Stable  Plan: donepezil (ARICEPT) 5 MG tablet        Continue Aricept    (F33.1) Moderate episode of recurrent major depressive disorder (H)  Comment: Controlled with current medication  Plan: escitalopram (LEXAPRO) 5 MG tablet, Basic         metabolic panel, TSH with free T4 reflex        Continue Lexapro    (I10) Essential hypertension  Comment: Controlled  Plan: Lipid panel reflex to direct LDL Fasting, Basic        metabolic panel, TSH with free T4 reflex        Continue current medication labs pending    (E78.2) Mixed hyperlipidemia  Comment: Has been controlled  Plan: Lipid panel reflex to direct LDL Fasting, Basic        metabolic panel, TSH with free T4 reflex        Labs pending    (  (J30.2) Seasonal allergies  Comment: Doing well  Plan: montelukast (SINGULAIR) 10 MG tablet        Medication renewed                  Mike Sanders is a 78 year old, presenting for the following health issues:  Recheck Medications (Her for a medication follow up) and Generalized Body Aches (Patient has upper back pain.)      4/30/2024     9:06 AM   Additional Questions   Roomed by Paulina-Mohamud   Accompanied by Walter Back     Patient presents to the clinic with her son to refill medications  She has a history of dementia hypertension hyperlipidemia she has chronic back pain secondary to surgery for a partial lung lobectomy in the 90s  No new complaints  [facility] from [facility] are reviewed daughter and son had no further questions or concerns    History of Present Illness       Back Pain:  She presents for follow up of back pain. Patient's back pain is a chronic  "problem.  Location of back pain:  Left upper back  Description of back pain: dull ache  Back pain spreads: nowhere    Since patient first noticed back pain, pain is: always present, but gets better and worse  Does back pain interfere with her job:  Not applicable       She eats 2-3 servings of fruits and vegetables daily.She consumes 0 sweetened beverage(s) daily.She exercises with enough effort to increase her heart rate 10 to 19 minutes per day.  She exercises with enough effort to increase her heart rate 3 or less days per week.   She is taking medications regularly.                 Review of Systems  Constitutional, neuro, ENT, endocrine, pulmonary, cardiac, gastrointestinal, genitourinary, musculoskeletal, integument and psychiatric systems are negative, except as otherwise noted.      Objective    /60 (BP Location: Left arm, Patient Position: Sitting, Cuff Size: Adult Regular)   Pulse 76   Temp 97.4  F (36.3  C) (Tympanic)   Ht 1.51 m (4' 11.45\")   Wt 55.8 kg (123 lb 1.6 oz)   LMP  (LMP Unknown)   SpO2 98%   BMI 24.49 kg/m    Body mass index is 24.49 kg/m .  Physical Exam alert attentive no acute distress  Vital signs are stable  Ears canals and drums normal  Conjunctiva pink  Nasal mucosa is clear  Oropharynx benign  Neck supple no adenopathy  Lungs clear well ventilated  Cardiovascular regular rate and rhythm  Abdomen soft nontender  There is no peripheral edema              Signed Electronically by: CIELO Gaines    "

## 2024-04-30 NOTE — LETTER
May 1, 2024      Marilyn Souza  [address]   [place] [address]        Dear ,    We are writing to inform you of your test results.    Your test results fall within the expected range(s) or remain unchanged from previous results.  Please continue with current treatment plan.    Resulted Orders   Lipid panel reflex to direct LDL Fasting   Result Value Ref Range    Cholesterol 160 <200 mg/dL    Triglycerides 83 <150 mg/dL    Direct Measure HDL 77 >=50 mg/dL    LDL Cholesterol Calculated 66 <=100 mg/dL    Non HDL Cholesterol 83 <130 mg/dL    Patient Fasting > 8hrs? Unknown     Narrative    Cholesterol  Desirable:  <200 mg/dL    Triglycerides  Normal:  Less than 150 mg/dL  Borderline High:  150-199 mg/dL  High:  200-499 mg/dL  Very High:  Greater than or equal to 500 mg/dL    Direct Measure HDL  Female:  Greater than or equal to 50 mg/dL   Male:  Greater than or equal to 40 mg/dL    LDL Cholesterol  Desirable:  <100mg/dL  Above Desirable:  100-129 mg/dL   Borderline High:  130-159 mg/dL   High:  160-189 mg/dL   Very High:  >= 190 mg/dL    Non HDL Cholesterol  Desirable:  130 mg/dL  Above Desirable:  130-159 mg/dL  Borderline High:  160-189 mg/dL  High:  190-219 mg/dL  Very High:  Greater than or equal to 220 mg/dL   Basic metabolic panel   Result Value Ref Range    Sodium 141 135 - 145 mmol/L      Comment:      Reference intervals for this test were updated on 09/26/2023 to more accurately reflect our healthy population. There may be differences in the flagging of prior results with similar values performed with this method. Interpretation of those prior results can be made in the context of the updated reference intervals.     Potassium 4.7 3.4 - 5.3 mmol/L    Chloride 102 98 - 107 mmol/L    Carbon Dioxide (CO2) 29 22 - 29 mmol/L    Anion Gap 10 7 - 15 mmol/L    Urea Nitrogen 17.0 8.0 - 23.0 mg/dL    Creatinine 0.71 0.51 - 0.95 mg/dL    GFR Estimate 86 >60 mL/min/1.73m2    Calcium 11.1 (H) 8.8 -  10.2 mg/dL    Glucose 93 70 - 99 mg/dL   Ionized Calcium   Result Value Ref Range    Calcium Ionized Whole Blood 5.6 (H) 4.4 - 5.2 mg/dL   TSH with free T4 reflex   Result Value Ref Range    TSH 2.62 0.30 - 4.20 uIU/mL       If you have any questions or concerns, please call the clinic at the number listed above.       Sincerely,      CIELO Newman

## 2024-08-16 ENCOUNTER — TELEPHONE (OUTPATIENT)
Dept: FAMILY MEDICINE | Facility: CLINIC | Age: 79
End: 2024-08-16
Payer: COMMERCIAL

## 2025-02-13 DIAGNOSIS — E78.5 HYPERLIPIDEMIA, UNSPECIFIED: ICD-10-CM

## 2025-02-13 DIAGNOSIS — J30.2 SEASONAL ALLERGIES: ICD-10-CM

## 2025-02-13 DIAGNOSIS — I10 ESSENTIAL (PRIMARY) HYPERTENSION: ICD-10-CM

## 2025-02-13 DIAGNOSIS — F33.1 MODERATE EPISODE OF RECURRENT MAJOR DEPRESSIVE DISORDER (H): ICD-10-CM

## 2025-02-13 DIAGNOSIS — F03.A0 MILD DEMENTIA WITHOUT BEHAVIORAL DISTURBANCE, PSYCHOTIC DISTURBANCE, MOOD DISTURBANCE, OR ANXIETY, UNSPECIFIED DEMENTIA TYPE (H): ICD-10-CM

## 2025-02-13 RX ORDER — MONTELUKAST SODIUM 10 MG/1
1 TABLET ORAL EVERY MORNING
Qty: 90 TABLET | Refills: 0 | Status: SHIPPED | OUTPATIENT
Start: 2025-02-13

## 2025-02-13 RX ORDER — LISINOPRIL 30 MG/1
30 TABLET ORAL DAILY
Qty: 90 TABLET | Refills: 0 | Status: SHIPPED | OUTPATIENT
Start: 2025-02-13

## 2025-02-13 RX ORDER — AMLODIPINE BESYLATE 5 MG/1
5 TABLET ORAL DAILY
Qty: 90 TABLET | Refills: 0 | Status: SHIPPED | OUTPATIENT
Start: 2025-02-13

## 2025-02-13 RX ORDER — ATORVASTATIN CALCIUM 40 MG/1
40 TABLET, FILM COATED ORAL DAILY
Qty: 90 TABLET | Refills: 0 | Status: SHIPPED | OUTPATIENT
Start: 2025-02-13

## 2025-02-13 RX ORDER — DONEPEZIL HYDROCHLORIDE 5 MG/1
TABLET, FILM COATED ORAL
Qty: 90 TABLET | Refills: 0 | Status: SHIPPED | OUTPATIENT
Start: 2025-02-13

## 2025-02-14 RX ORDER — ESCITALOPRAM OXALATE 5 MG/1
5 TABLET ORAL DAILY
Qty: 90 TABLET | Refills: 0 | Status: SHIPPED | OUTPATIENT
Start: 2025-02-14

## 2025-04-07 NOTE — LETTER
(Inserted Image. Unable to display)   December 26, 2019      TARA BURCH  [address]  [place], [place] [id number]        Dear TARA,      Thank you for selecting [facility] (previously [facility]) for your healthcare needs.     Our records indicate you are due for the following services:     Clinical Support Staff (CSS)-Only Blood Pressure Check ~ Please stop in anytime to have your blood pressure rechecked. This is a free service and no appointment necessary.    So we can best determine if your medications are effective in lowering your blood pressure, please take your medications 1-2 hours before coming in to have your blood pressure checked.  We encourage you to avoid caffeine or other stimulants prior to having your blood pressure checked and come at a time when you are not feeling rushed.     If you check your blood pressure at home, please bring in your blood pressure monitor and home blood pressure readings.  We will check your machine for accuracy and also share your home readings with your Healthcare Provider.     To schedule an appointment or if you have further questions, please contact your primary clinic:   Critical access hospital          [phone]   [facility]    [phone]             [facility]         [phone]      Powered by [employer]    Sincerely,    Joe Rosas M.D.   Addended by: JENNA PAVON on: 4/7/2025 12:03 PM     Modules accepted: Orders

## 2025-06-02 ENCOUNTER — OFFICE VISIT (OUTPATIENT)
Dept: FAMILY MEDICINE | Facility: CLINIC | Age: 80
End: 2025-06-02
Payer: COMMERCIAL

## 2025-06-02 ENCOUNTER — TELEPHONE (OUTPATIENT)
Dept: FAMILY MEDICINE | Facility: CLINIC | Age: 80
End: 2025-06-02

## 2025-06-02 VITALS
DIASTOLIC BLOOD PRESSURE: 64 MMHG | WEIGHT: 120.3 LBS | HEIGHT: 60 IN | RESPIRATION RATE: 16 BRPM | TEMPERATURE: 98 F | SYSTOLIC BLOOD PRESSURE: 110 MMHG | OXYGEN SATURATION: 97 % | HEART RATE: 77 BPM | BODY MASS INDEX: 23.62 KG/M2

## 2025-06-02 DIAGNOSIS — I10 ESSENTIAL (PRIMARY) HYPERTENSION: ICD-10-CM

## 2025-06-02 DIAGNOSIS — E78.00 PURE HYPERCHOLESTEROLEMIA: ICD-10-CM

## 2025-06-02 DIAGNOSIS — Z00.00 MEDICARE ANNUAL WELLNESS VISIT, SUBSEQUENT: ICD-10-CM

## 2025-06-02 DIAGNOSIS — J30.2 SEASONAL ALLERGIES: ICD-10-CM

## 2025-06-02 DIAGNOSIS — M17.0 PRIMARY OSTEOARTHRITIS OF BOTH KNEES: ICD-10-CM

## 2025-06-02 DIAGNOSIS — F03.A0 MILD DEMENTIA WITHOUT BEHAVIORAL DISTURBANCE, PSYCHOTIC DISTURBANCE, MOOD DISTURBANCE, OR ANXIETY, UNSPECIFIED DEMENTIA TYPE (H): ICD-10-CM

## 2025-06-02 DIAGNOSIS — Z23 NEED FOR VACCINATION: Primary | ICD-10-CM

## 2025-06-02 DIAGNOSIS — F33.1 MODERATE EPISODE OF RECURRENT MAJOR DEPRESSIVE DISORDER (H): ICD-10-CM

## 2025-06-02 LAB
ANION GAP SERPL CALCULATED.3IONS-SCNC: 11 MMOL/L (ref 7–15)
BUN SERPL-MCNC: 19.9 MG/DL (ref 8–23)
CALCIUM SERPL-MCNC: 10.9 MG/DL (ref 8.8–10.4)
CHLORIDE SERPL-SCNC: 104 MMOL/L (ref 98–107)
CHOLEST SERPL-MCNC: 169 MG/DL
CREAT SERPL-MCNC: 0.69 MG/DL (ref 0.51–0.95)
EGFRCR SERPLBLD CKD-EPI 2021: 87 ML/MIN/1.73M2
ERYTHROCYTE [DISTWIDTH] IN BLOOD BY AUTOMATED COUNT: 12.1 % (ref 10–15)
FASTING STATUS PATIENT QL REPORTED: NO
FASTING STATUS PATIENT QL REPORTED: NO
GLUCOSE SERPL-MCNC: 83 MG/DL (ref 70–99)
HCO3 SERPL-SCNC: 25 MMOL/L (ref 22–29)
HCT VFR BLD AUTO: 40 % (ref 35–47)
HDLC SERPL-MCNC: 78 MG/DL
HGB BLD-MCNC: 13.1 G/DL (ref 11.7–15.7)
LDLC SERPL CALC-MCNC: 77 MG/DL
MCH RBC QN AUTO: 32.7 PG (ref 26.5–33)
MCHC RBC AUTO-ENTMCNC: 32.8 G/DL (ref 31.5–36.5)
MCV RBC AUTO: 100 FL (ref 78–100)
NONHDLC SERPL-MCNC: 91 MG/DL
PLATELET # BLD AUTO: 204 10E3/UL (ref 150–450)
POTASSIUM SERPL-SCNC: 4.2 MMOL/L (ref 3.4–5.3)
RBC # BLD AUTO: 4.01 10E6/UL (ref 3.8–5.2)
SODIUM SERPL-SCNC: 140 MMOL/L (ref 135–145)
TRIGL SERPL-MCNC: 69 MG/DL
WBC # BLD AUTO: 6.8 10E3/UL (ref 4–11)

## 2025-06-02 PROCEDURE — 3078F DIAST BP <80 MM HG: CPT | Performed by: PHYSICIAN ASSISTANT

## 2025-06-02 PROCEDURE — 80051 ELECTROLYTE PANEL: CPT | Performed by: PHYSICIAN ASSISTANT

## 2025-06-02 PROCEDURE — 99214 OFFICE O/P EST MOD 30 MIN: CPT | Mod: 25 | Performed by: PHYSICIAN ASSISTANT

## 2025-06-02 PROCEDURE — 3074F SYST BP LT 130 MM HG: CPT | Performed by: PHYSICIAN ASSISTANT

## 2025-06-02 PROCEDURE — 85027 COMPLETE CBC AUTOMATED: CPT | Performed by: PHYSICIAN ASSISTANT

## 2025-06-02 PROCEDURE — 36415 COLL VENOUS BLD VENIPUNCTURE: CPT | Performed by: PHYSICIAN ASSISTANT

## 2025-06-02 PROCEDURE — 82465 ASSAY BLD/SERUM CHOLESTEROL: CPT | Performed by: PHYSICIAN ASSISTANT

## 2025-06-02 PROCEDURE — G0439 PPPS, SUBSEQ VISIT: HCPCS | Performed by: PHYSICIAN ASSISTANT

## 2025-06-02 RX ORDER — AMLODIPINE BESYLATE 5 MG/1
5 TABLET ORAL DAILY
Qty: 90 TABLET | Refills: 3 | Status: SHIPPED | OUTPATIENT
Start: 2025-06-02

## 2025-06-02 RX ORDER — MONTELUKAST SODIUM 10 MG/1
1 TABLET ORAL EVERY MORNING
Qty: 90 TABLET | Refills: 3 | Status: SHIPPED | OUTPATIENT
Start: 2025-06-02

## 2025-06-02 RX ORDER — DONEPEZIL HYDROCHLORIDE 5 MG/1
TABLET, FILM COATED ORAL
Qty: 90 TABLET | Refills: 3 | Status: SHIPPED | OUTPATIENT
Start: 2025-06-02

## 2025-06-02 RX ORDER — ATORVASTATIN CALCIUM 40 MG/1
40 TABLET, FILM COATED ORAL DAILY
Qty: 90 TABLET | Refills: 0 | Status: SHIPPED | OUTPATIENT
Start: 2025-06-02

## 2025-06-02 RX ORDER — ESCITALOPRAM OXALATE 5 MG/1
5 TABLET ORAL DAILY
Qty: 90 TABLET | Refills: 3 | Status: SHIPPED | OUTPATIENT
Start: 2025-06-02

## 2025-06-02 RX ORDER — LISINOPRIL 30 MG/1
30 TABLET ORAL DAILY
Qty: 90 TABLET | Refills: 3 | Status: SHIPPED | OUTPATIENT
Start: 2025-06-02

## 2025-06-02 SDOH — HEALTH STABILITY: PHYSICAL HEALTH: ON AVERAGE, HOW MANY DAYS PER WEEK DO YOU ENGAGE IN MODERATE TO STRENUOUS EXERCISE (LIKE A BRISK WALK)?: 7 DAYS

## 2025-06-02 ASSESSMENT — SOCIAL DETERMINANTS OF HEALTH (SDOH): HOW OFTEN DO YOU GET TOGETHER WITH FRIENDS OR RELATIVES?: MORE THAN THREE TIMES A WEEK

## 2025-06-02 ASSESSMENT — PATIENT HEALTH QUESTIONNAIRE - PHQ9
SUM OF ALL RESPONSES TO PHQ QUESTIONS 1-9: 0
SUM OF ALL RESPONSES TO PHQ QUESTIONS 1-9: 0
10. IF YOU CHECKED OFF ANY PROBLEMS, HOW DIFFICULT HAVE THESE PROBLEMS MADE IT FOR YOU TO DO YOUR WORK, TAKE CARE OF THINGS AT HOME, OR GET ALONG WITH OTHER PEOPLE: NOT DIFFICULT AT ALL

## 2025-06-02 NOTE — TELEPHONE ENCOUNTER
Patient Quality Outreach    Patient is due for the following:   Physical Annual Wellness Visit, labs    Action(s) Taken:   Schedule a Annual Wellness Visit    Type of outreach:    Phone, spoke to patient/parent. Todays office apt changed to [facility]    Questions for provider review:    None         Lakesha Golden MA  Chart routed to None.

## 2025-06-02 NOTE — PROGRESS NOTES
Preventive Care Visit  [facility]  CIELO Gaines, Family Medicine  Jun 2, 2025      Assessment & Plan     (Z23) Need for vaccination  (primary encounter diagnosis)  Comment: May get at pharmacy  Plan:     (E78.00) Pure hypercholesterolemia  Comment: Check for stability  Plan: BASIC METABOLIC PANEL, Lipid panel reflex to         direct LDL Non-fasting, atorvastatin (LIPITOR)         40 MG tablet, CBC with platelets        Continue medication    (J30.2) Seasonal allergies  Comment: Controlled  Plan: montelukast (SINGULAIR) 10 MG tablet        Continue current therapy    (I10) Essential (primary) hypertension  Comment: Ideally controlled  Plan: BASIC METABOLIC PANEL, lisinopril (ZESTRIL) 30         MG tablet, amLODIPine (NORVASC) 5 MG tablet,         CBC with platelets        Labs pending medications renewed    (F33.1) Moderate episode of recurrent major depressive disorder (H)  Comment: Stable  Plan: BASIC METABOLIC PANEL, escitalopram (LEXAPRO) 5        MG tablet, CBC with platelets        Continue current therapy    (F03.A0) Mild dementia without behavioral disturbance, psychotic disturbance, mood disturbance, or anxiety, unspecified dementia type (H)  Comment: Stable  Plan: donepezil (ARICEPT) 5 MG tablet        Continue current therapy    (Z00.00) Medicare annual wellness visit, subsequent  Comment: Return in 1 year  Plan:             Counseling  Appropriate preventive services were addressed with this patient via screening, questionnaire, or discussion as appropriate for fall prevention, nutrition, physical activity, Tobacco-use cessation, social engagement, weight loss and cognition.  Checklist reviewing preventive services available has been given to the patient.  Reviewed patient's diet, addressing concerns and/or questions.   The patient was instructed to see the dentist every 6 months.           Mike Sanders is a 79 year old, presenting for the following:  Medicare  Visit ([facility])        6/2/2025     1:43 PM   Additional Questions   Roomed by SUGAR Crowder          Patient is here with her son for annual wellness visit  She has advanced dementia  She has a history of hypertension hyperlipidemia  She is a resident at [facility]  She sleeps fairly well  Her appetite is fair  She does walk the halls at the assisted living center  She has some osteoarthritic pain in her knees  She states Tylenol was not helpful  We discussed Voltaren gel and her son uses this and will pick some up for her                 Advance Care Planning    Document on file is a Health Care Directive or POLST.        6/2/2025   General Health   How would you rate your overall physical health? Good   Feel stress (tense, anxious, or unable to sleep) Not at all         6/2/2025   Nutrition   Diet: Regular (no restrictions)         6/2/2025   Exercise   Days per week of moderate/strenous exercise 7 days         6/2/2025   Social Factors   Frequency of gathering with friends or relatives More than three times a week   Worry food won't last until get money to buy more No   Food not last or not have enough money for food? No   Do you have housing? (Housing is defined as stable permanent housing and does not include staying outside in a car, in a tent, in an abandoned building, in an overnight shelter, or couch-surfing.) Yes   Are you worried about losing your housing? No   Lack of transportation? No   Unable to get utilities (heat,electricity)? No         6/2/2025   Fall Risk   Fallen 2 or more times in the past year? No   Trouble with walking or balance? No          6/2/2025   Activities of Daily Living- Home Safety   Needs help with the following daily activites None of the above   Safety concerns in the home None of the above         6/2/2025   Dental   Dentist two times every year? (!) NO         6/2/2025   Hearing Screening   Hearing concerns? None of the above         6/2/2025   Driving Risk Screening    Patient/family members have concerns about driving No         6/2/2025   General Alertness/Fatigue Screening   Have you been more tired than usual lately? No         6/2/2025   Urinary Incontinence Screening   Bothered by leaking urine in past 6 months No       Today's PHQ-9 Score:       6/2/2025     1:38 PM   PHQ-9 SCORE   PHQ-9 Total Score MyChart 0   PHQ-9 Total Score 0        Patient-reported         6/2/2025   Substance Use   Alcohol more than 3/day or more than 7/wk Not Applicable   Do you have a current opioid prescription? No   How severe/bad is pain from 1 to 10? 5/10   Do you use any other substances recreationally? No     Social History     Tobacco Use    Smoking status: Never     Passive exposure: Never    Smokeless tobacco: Never   Vaping Use    Vaping status: Never Used                        Reviewed and updated as needed this visit by Provider                      Current providers sharing in care for this patient include:  Patient Care Team:  Joe Rosas MD as PCP - General (Family Medicine)  Colby Colunga PA as Assigned PCP    The following health maintenance items are reviewed in Epic and correct as of today:  Health Maintenance   Topic Date Due    DEPRESSION ACTION PLAN  Never done    DTAP/TDAP/TD VACCINE (1 - Tdap) 06/05/2010    RSV VACCINE (1 - 1-dose 75+ series) Never done    MEDICARE ANNUAL WELLNESS VISIT  11/19/2020    ANNUAL REVIEW OF HM ORDERS  05/17/2024    COVID-19 VACCINE (5 - 2024-25 season) 09/01/2024    BMP  04/30/2025    LIPID  04/30/2025    PHQ-9  12/02/2025    FALL RISK ASSESSMENT  06/02/2026    DIABETES SCREENING  04/30/2027    ADVANCE CARE PLANNING  05/19/2027    DEXA  10/25/2027    INFLUENZA VACCINE  Completed    PNEUMOCOCCAL VACCINE 50+ YEARS  Completed    ZOSTER VACCINE  Completed    HPV VACCINE  Aged Out    MENINGITIS VACCINE  Aged Out            Objective    Exam  /64 (BP Location: Right arm, Patient Position: Sitting, Cuff Size: Adult Regular)   Pulse  "77   Temp 98  F (36.7  C) (Tympanic)   Resp 16   Ht 1.511 m (4' 11.5\")   Wt 54.6 kg (120 lb 4.8 oz)   LMP  (LMP Unknown)   SpO2 97%   BMI 23.89 kg/m     Estimated body mass index is 23.89 kg/m  as calculated from the following:    Height as of this encounter: 1.511 m (4' 11.5\").    Weight as of this encounter: 54.6 kg (120 lb 4.8 oz).    Physical Exam  Vitals and nursing note reviewed.   Constitutional:       Appearance: Normal appearance.   HENT:      Head: Normocephalic and atraumatic.      Right Ear: Tympanic membrane and ear canal normal.      Left Ear: Tympanic membrane and ear canal normal.      Nose: Nose normal.      Mouth/Throat:      Mouth: Mucous membranes are moist.      Pharynx: No posterior oropharyngeal erythema.   Eyes:      Conjunctiva/sclera: Conjunctivae normal.   Cardiovascular:      Rate and Rhythm: Normal rate and regular rhythm.      Heart sounds: Normal heart sounds.   Pulmonary:      Effort: Pulmonary effort is normal.      Breath sounds: Normal breath sounds.   Abdominal:      Palpations: Abdomen is soft. There is no mass.      Tenderness: There is no abdominal tenderness.   Musculoskeletal:         General: Normal range of motion.      Cervical back: Normal range of motion and neck supple.   Lymphadenopathy:      Cervical: No cervical adenopathy.   Skin:     General: Skin is warm and dry.      Findings: No rash.   Neurological:      General: No focal deficit present.      Mental Status: She is alert.   Psychiatric:         Mood and Affect: Mood normal.         Behavior: Behavior normal.         Thought Content: Thought content normal.         Judgment: Judgment normal.               6/2/2025   Mini Cog   Mini-Cog Not Completed (choose reason) Known dementia              Signed Electronically by: CIELO Gaines    Answers submitted by the patient for this visit:  Patient Health Questionnaire (Submitted on 6/2/2025)  If you checked off any problems, how difficult have these " problems made it for you to do your work, take care of things at home, or get along with other people?: Not difficult at all  PHQ9 TOTAL SCORE: 0

## 2025-06-03 ENCOUNTER — RESULTS FOLLOW-UP (OUTPATIENT)
Dept: FAMILY MEDICINE | Facility: CLINIC | Age: 80
End: 2025-06-03

## 2025-09-02 DIAGNOSIS — E78.00 PURE HYPERCHOLESTEROLEMIA: ICD-10-CM

## 2025-09-04 RX ORDER — ATORVASTATIN CALCIUM 40 MG/1
40 TABLET, FILM COATED ORAL DAILY
Qty: 90 TABLET | Refills: 2 | Status: SHIPPED | OUTPATIENT
Start: 2025-09-04